# Patient Record
Sex: MALE | Race: WHITE | Employment: FULL TIME | URBAN - METROPOLITAN AREA
[De-identification: names, ages, dates, MRNs, and addresses within clinical notes are randomized per-mention and may not be internally consistent; named-entity substitution may affect disease eponyms.]

---

## 2017-04-29 ENCOUNTER — APPOINTMENT (OUTPATIENT)
Dept: GENERAL RADIOLOGY | Age: 42
End: 2017-04-29
Attending: PHYSICIAN ASSISTANT
Payer: COMMERCIAL

## 2017-04-29 ENCOUNTER — HOSPITAL ENCOUNTER (OUTPATIENT)
Age: 42
Discharge: HOME | End: 2017-04-29
Payer: COMMERCIAL

## 2017-04-29 ENCOUNTER — HOSPITAL ENCOUNTER (EMERGENCY)
Age: 42
Discharge: STILL ADMITTED OR ADMINISTRATIVE | End: 2017-04-29
Payer: COMMERCIAL

## 2017-04-29 VITALS
RESPIRATION RATE: 20 BRPM | HEIGHT: 75 IN | SYSTOLIC BLOOD PRESSURE: 123 MMHG | DIASTOLIC BLOOD PRESSURE: 84 MMHG | WEIGHT: 255 LBS | TEMPERATURE: 97 F | OXYGEN SATURATION: 98 % | BODY MASS INDEX: 31.71 KG/M2 | HEART RATE: 62 BPM

## 2017-04-29 DIAGNOSIS — S50.12XA TRAUMATIC HEMATOMA OF LEFT FOREARM, INITIAL ENCOUNTER: Primary | ICD-10-CM

## 2017-04-29 PROCEDURE — 99203 OFFICE O/P NEW LOW 30 MIN: CPT

## 2017-04-29 PROCEDURE — 73090 X-RAY EXAM OF FOREARM: CPT | Performed by: RADIOLOGY

## 2017-04-29 PROCEDURE — 99213 OFFICE O/P EST LOW 20 MIN: CPT

## 2017-04-29 NOTE — ED INITIAL ASSESSMENT (HPI)
Pt was holding on to a dog leash that pulled him into a doorframe,(At 730 am today) and now left forearm above wrist is very swollen, and painful

## 2017-04-29 NOTE — ED PROVIDER NOTES
Patient Seen in: [facility] In [address] [place] [address],[address]    History   Patient presents with:  Arm Pain    Stated Complaint: LT ARM SPRAIN    DENIA Victoria is a 51-year-old male who presents today for evaluation of left arm contusion.   Earlier this morning, ED Triage Vitals   BP 04/29/17 1313 130/86 mmHg   Pulse 04/29/17 1307 67   Resp 04/29/17 1307 18   Temp 04/29/17 1307 96.5 °F (35.8 °C)   Temp src 04/29/17 1307 Temporal   SpO2 04/29/17 1307 99 %   O2 Device 04/29/17 1307 None (Room air)       Current:BP Plan: X-rays are reviewed with the patient. Patient is placed in Ace wrap. He is instructed on R ICE and NSAID use. He is encouraged to watch for signs of infection, as hematoma may become infected.   The patient is encouraged to return if any concerning

## 2017-10-03 ENCOUNTER — CHARTING TRANS (OUTPATIENT)
Dept: OTHER | Age: 42
End: 2017-10-03

## 2018-09-27 ENCOUNTER — OFFICE VISIT (OUTPATIENT)
Dept: FAMILY MEDICINE CLINIC | Facility: CLINIC | Age: 43
End: 2018-09-27

## 2018-09-27 VITALS
RESPIRATION RATE: 16 BRPM | HEART RATE: 72 BPM | WEIGHT: 257 LBS | SYSTOLIC BLOOD PRESSURE: 138 MMHG | DIASTOLIC BLOOD PRESSURE: 86 MMHG | HEIGHT: 75 IN | TEMPERATURE: 98 F | BODY MASS INDEX: 31.95 KG/M2

## 2018-09-27 DIAGNOSIS — E55.9 VITAMIN D DEFICIENCY: ICD-10-CM

## 2018-09-27 DIAGNOSIS — B35.1 TOENAIL FUNGUS: ICD-10-CM

## 2018-09-27 DIAGNOSIS — Z00.00 LABORATORY EXAMINATION ORDERED AS PART OF A ROUTINE GENERAL MEDICAL EXAMINATION: ICD-10-CM

## 2018-09-27 DIAGNOSIS — Z23 NEED FOR INFLUENZA VACCINATION: ICD-10-CM

## 2018-09-27 DIAGNOSIS — Z00.00 WELLNESS EXAMINATION: Primary | ICD-10-CM

## 2018-09-27 PROCEDURE — 90686 IIV4 VACC NO PRSV 0.5 ML IM: CPT | Performed by: PHYSICIAN ASSISTANT

## 2018-09-27 PROCEDURE — 90471 IMMUNIZATION ADMIN: CPT | Performed by: PHYSICIAN ASSISTANT

## 2018-09-27 PROCEDURE — 99396 PREV VISIT EST AGE 40-64: CPT | Performed by: PHYSICIAN ASSISTANT

## 2018-09-27 RX ORDER — LORATADINE 10 MG/1
10 TABLET ORAL DAILY
COMMUNITY

## 2018-09-27 NOTE — PROGRESS NOTES
REASON FOR VISIT:    Alyson Santana is a 41year old male who presents for an Annual Health Assessment.     Pt needs paperwork for insurance    Pt would like a refill on Penlac, he has used it in the past for toenail fungus and the symptoms have returned 02/28/2014 140 (H)     LDL Cholesterol (mg/dL)   Date Value   09/28/2018 149 (H)      Diabetes Screening  If history of high blood pressure or other  risk factors No results found for: A1C  Glucose (mg/dL)   Date Value   09/28/2018 97     GLUCOSE (mg/dL) REVIEW OF SYSTEMS:   GENERAL: feels well otherwise  SKIN: denies any unusual skin lesions, + toenail fungus  EYES: denies blurred vision or double vision  HEENT: denies nasal congestion, sinus pain or ST  LUNGS: denies shortness of breath with exertion Future    Laboratory examination ordered as part of a routine general medical examination  -     CBC WITH DIFFERENTIAL WITH PLATELET; Future  -     COMP METABOLIC PANEL (14); Future  -     LIPID PANEL;  Future  -     TSH+FREE T4; Future  -     VITAMIN D, 25

## 2018-09-27 NOTE — PATIENT INSTRUCTIONS
Thank you for choosing Codie Munguia PA-C at [place] [address]  To Do: Jenna Duffy  1. Pt to get labs done  2.  Follow-up in 1 year pending those    • Please signup for MY CHART, which is electronic access to your record if you have not done so.  Scheduling at [phone]  Please allow our office 5 business days to contact you regarding any testing results.     Refill policies:   Allow 3 business days for refills; controlled substances may take longer and must be picked up from the office in person

## 2018-09-28 ENCOUNTER — LAB ENCOUNTER (OUTPATIENT)
Dept: LAB | Age: 43
End: 2018-09-28
Attending: FAMILY MEDICINE
Payer: COMMERCIAL

## 2018-09-28 ENCOUNTER — TELEPHONE (OUTPATIENT)
Dept: FAMILY MEDICINE CLINIC | Facility: CLINIC | Age: 43
End: 2018-09-28

## 2018-09-28 DIAGNOSIS — Z00.00 LABORATORY EXAMINATION ORDERED AS PART OF A ROUTINE GENERAL MEDICAL EXAMINATION: ICD-10-CM

## 2018-09-28 DIAGNOSIS — E55.9 VITAMIN D DEFICIENCY: ICD-10-CM

## 2018-09-28 LAB
ALBUMIN SERPL-MCNC: 4.4 G/DL (ref 3.5–4.8)
ALBUMIN/GLOB SERPL: 1.5 {RATIO} (ref 1–2)
ALP LIVER SERPL-CCNC: 92 U/L (ref 45–117)
ALT SERPL-CCNC: 71 U/L (ref 17–63)
ANION GAP SERPL CALC-SCNC: 6 MMOL/L (ref 0–18)
AST SERPL-CCNC: 25 U/L (ref 15–41)
BASOPHILS # BLD AUTO: 0.02 X10(3) UL (ref 0–0.1)
BASOPHILS NFR BLD AUTO: 0.4 %
BILIRUB SERPL-MCNC: 0.9 MG/DL (ref 0.1–2)
BUN BLD-MCNC: 14 MG/DL (ref 8–20)
BUN/CREAT SERPL: 13.9 (ref 10–20)
CALCIUM BLD-MCNC: 9 MG/DL (ref 8.3–10.3)
CHLORIDE SERPL-SCNC: 105 MMOL/L (ref 101–111)
CHOLEST SMN-MCNC: 217 MG/DL (ref ?–200)
CO2 SERPL-SCNC: 30 MMOL/L (ref 22–32)
COMPLEXED PSA SERPL-MCNC: 1.04 NG/ML (ref 0.01–4)
CREAT BLD-MCNC: 1.01 MG/DL (ref 0.7–1.3)
EOSINOPHIL # BLD AUTO: 0.07 X10(3) UL (ref 0–0.3)
EOSINOPHIL NFR BLD AUTO: 1.3 %
ERYTHROCYTE [DISTWIDTH] IN BLOOD BY AUTOMATED COUNT: 12.4 % (ref 11.5–16)
GLOBULIN PLAS-MCNC: 3 G/DL (ref 2.5–4)
GLUCOSE BLD-MCNC: 97 MG/DL (ref 70–99)
HCT VFR BLD AUTO: 48.7 % (ref 37–53)
HDLC SERPL-MCNC: 44 MG/DL (ref 40–59)
HGB BLD-MCNC: 17 G/DL (ref 13–17)
IMMATURE GRANULOCYTE COUNT: 0.01 X10(3) UL (ref 0–1)
IMMATURE GRANULOCYTE RATIO %: 0.2 %
LDLC SERPL CALC-MCNC: 149 MG/DL (ref ?–100)
LYMPHOCYTES # BLD AUTO: 0.79 X10(3) UL (ref 0.9–4)
LYMPHOCYTES NFR BLD AUTO: 14.2 %
M PROTEIN MFR SERPL ELPH: 7.4 G/DL (ref 6.1–8.3)
MCH RBC QN AUTO: 30.4 PG (ref 27–33.2)
MCHC RBC AUTO-ENTMCNC: 34.9 G/DL (ref 31–37)
MCV RBC AUTO: 87 FL (ref 80–99)
MONOCYTES # BLD AUTO: 0.42 X10(3) UL (ref 0.1–1)
MONOCYTES NFR BLD AUTO: 7.5 %
NEUTROPHIL ABS PRELIM: 4.26 X10 (3) UL (ref 1.3–6.7)
NEUTROPHILS # BLD AUTO: 4.26 X10(3) UL (ref 1.3–6.7)
NEUTROPHILS NFR BLD AUTO: 76.4 %
NONHDLC SERPL-MCNC: 173 MG/DL (ref ?–130)
OSMOLALITY SERPL CALC.SUM OF ELEC: 292 MOSM/KG (ref 275–295)
PLATELET # BLD AUTO: 249 10(3)UL (ref 150–450)
POTASSIUM SERPL-SCNC: 4.1 MMOL/L (ref 3.6–5.1)
RBC # BLD AUTO: 5.6 X10(6)UL (ref 4.3–5.7)
RED CELL DISTRIBUTION WIDTH-SD: 39.1 FL (ref 35.1–46.3)
SODIUM SERPL-SCNC: 141 MMOL/L (ref 136–144)
T4 FREE SERPL-MCNC: 1.1 NG/DL (ref 0.9–1.8)
TRIGL SERPL-MCNC: 119 MG/DL (ref 30–149)
TSI SER-ACNC: 1.56 MIU/ML (ref 0.35–5.5)
VIT D+METAB SERPL-MCNC: 18.7 NG/ML (ref 30–100)
VLDLC SERPL CALC-MCNC: 24 MG/DL (ref 0–30)
WBC # BLD AUTO: 5.6 X10(3) UL (ref 4–13)

## 2018-09-28 PROCEDURE — 84439 ASSAY OF FREE THYROXINE: CPT

## 2018-09-28 PROCEDURE — 80053 COMPREHEN METABOLIC PANEL: CPT

## 2018-09-28 PROCEDURE — 84443 ASSAY THYROID STIM HORMONE: CPT

## 2018-09-28 PROCEDURE — 80061 LIPID PANEL: CPT

## 2018-09-28 PROCEDURE — 85025 COMPLETE CBC W/AUTO DIFF WBC: CPT

## 2018-09-28 PROCEDURE — 82306 VITAMIN D 25 HYDROXY: CPT

## 2018-09-28 PROCEDURE — 36415 COLL VENOUS BLD VENIPUNCTURE: CPT

## 2018-09-28 NOTE — TELEPHONE ENCOUNTER
Pt was seen by Nellie Nissen on 9/27/18 for employee physical and brought in form. Labs completed and form completed and signed by Nellie Nissen. Faxed form to Jony @ [phone]. Fax confirmation received.

## 2018-10-02 NOTE — PROGRESS NOTES
Elevated cholesterol, work on low carb diet, increase physical activity  Low Vit D, begin 50,000 IU weekly x 12 weeks, and then OTC 2,000 IU daily  One liver enzyme is a little elevated, repeat CMP in 2 weeks  OV in 6 months, recheck lipids at that time

## 2018-10-11 DIAGNOSIS — E78.00 ELEVATED CHOLESTEROL: Primary | ICD-10-CM

## 2018-10-11 RX ORDER — ERGOCALCIFEROL 1.25 MG/1
50000 CAPSULE ORAL WEEKLY
Qty: 12 CAPSULE | Refills: 0 | Status: SHIPPED | OUTPATIENT
Start: 2018-10-11 | End: 2018-12-28

## 2018-11-02 VITALS
TEMPERATURE: 99.2 F | HEIGHT: 75 IN | WEIGHT: 240 LBS | BODY MASS INDEX: 29.84 KG/M2 | SYSTOLIC BLOOD PRESSURE: 122 MMHG | HEART RATE: 72 BPM | RESPIRATION RATE: 12 BRPM | DIASTOLIC BLOOD PRESSURE: 78 MMHG

## 2019-09-23 ENCOUNTER — OFFICE VISIT (OUTPATIENT)
Dept: FAMILY MEDICINE CLINIC | Facility: CLINIC | Age: 44
End: 2019-09-23

## 2019-09-23 VITALS
SYSTOLIC BLOOD PRESSURE: 134 MMHG | WEIGHT: 255 LBS | BODY MASS INDEX: 31.71 KG/M2 | HEART RATE: 80 BPM | RESPIRATION RATE: 14 BRPM | DIASTOLIC BLOOD PRESSURE: 80 MMHG | TEMPERATURE: 98 F | HEIGHT: 75 IN | OXYGEN SATURATION: 98 %

## 2019-09-23 DIAGNOSIS — E78.5 DYSLIPIDEMIA: ICD-10-CM

## 2019-09-23 DIAGNOSIS — Z00.00 ROUTINE GENERAL MEDICAL EXAMINATION AT A HEALTH CARE FACILITY: ICD-10-CM

## 2019-09-23 DIAGNOSIS — Z00.00 WELL ADULT EXAM: Primary | ICD-10-CM

## 2019-09-23 DIAGNOSIS — Z23 NEED FOR INFLUENZA VACCINATION: ICD-10-CM

## 2019-09-23 DIAGNOSIS — E55.9 HYPOVITAMINOSIS D: ICD-10-CM

## 2019-09-23 PROCEDURE — 90471 IMMUNIZATION ADMIN: CPT | Performed by: FAMILY MEDICINE

## 2019-09-23 PROCEDURE — 90686 IIV4 VACC NO PRSV 0.5 ML IM: CPT | Performed by: FAMILY MEDICINE

## 2019-09-23 PROCEDURE — 99396 PREV VISIT EST AGE 40-64: CPT | Performed by: FAMILY MEDICINE

## 2019-09-23 NOTE — PATIENT INSTRUCTIONS
Prevention Guidelines, Men Ages 36 to 52  Screening tests and vaccines are an important part of managing your health. A screening test is done to find possible disorders or diseases in people who don't have any symptoms.  The goal is to find a disease ear Syphilis Men at increased risk for infection – talk with your healthcare provider At routine exams   Tuberculosis Men at increased risk for infection – talk with your healthcare provider Check with your healthcare provider   Vision All men in this age [de-identified] Diet and exercise Men who are overweight or obese When diagnosed, and then at routine exams   Sexually transmitted infection prevention Men at increased risk for infection – talk with your healthcare provider At routine exams   Use of daily aspirin Men age · Half a cup cut-up raw or cooked vegetable  · Half a cup vegetable juice  Best choices: Fresh or frozen vegetables prepared without added salt or fat.    Fruits  Servings: 4 to 5 a day  A serving is:  · 1 medium fruit  · One-quarter cup dried fruit  · Half Date Last Reviewed: 6/1/2016  © 9629-2207 [employer] [address]. [address] [facility] [place], [address] [place] [place]. All rights reserved. This information is not intended as a substitute for professional medical care.  Always follow your healthcare professional'

## 2019-09-23 NOTE — PROGRESS NOTES
Patient presents with:  Physical: Routine annual physical with fasting labs due  Complete Form: Biometric screening form  Imm/Inj: Flu shot- consent signed      HPI:  Patient presents with:  Physical: Routine annual physical with fasting labs due  Complete HENT: Negative for hearing loss, congestion, sore throat, neck pain and dental problem. Eyes: Negative for pain and visual disturbance. Respiratory: Negative for cough, chest tightness, shortness of breath and wheezing.     Cardiovascular: Negative for loratadine 10 MG Oral Tab Take 10 mg by mouth daily.  Disp:  Rfl:        Allergies  No Known Allergies    Health Maintenance  Immunizations:    Immunization History  Administered            Date(s) Administered    FLULAVAL 6 months & older 0.5 ml Prefilled - FLULAVAL INFLUENZA VACCINE QUAD PRESERVATIVE FREE 0.5 ML    3. Dyslipidemia   I recommended daily aerobic exercise for 30 minutes atleast 5 days per week. Along with a healthy diet that is high in fruits, veggies, lean meats, protein.     - LIPID PANEL; F Blood pressure All men in this age group Yearly checkup if your blood pressure reading is normal  Normal blood pressure is less than 120/80 mm Hg  If your blood pressure is higher than normal, follow the advice of your healthcare provider      Depression A Hepatitis B Men at increased risk for infection – talk with your healthcare provider 3 doses over 6 months; second dose should be given 1 month after the first dose; the third dose should be given at least 2 months after the second dose and at least 4 yasemin © 9717-5958 [employer] [address]. [address] [facility] [place], [address] [place]. All rights reserved. This information is not intended as a substitute for professional medical care. Always follow your healthcare professional's instructions.         Eating · One and a half ounces cheese  Best choices: Skim or 1% milk, low-fat or fat-free yogurt or buttermilk, and low-fat cheeses.         Lean meats, poultry, fish  Servings: 6 or fewer a day  A serving is:  · 1 ounce cooked meats, poultry, or fish  · 1 egg  B

## 2019-09-24 ENCOUNTER — LAB ENCOUNTER (OUTPATIENT)
Dept: LAB | Age: 44
End: 2019-09-24
Attending: FAMILY MEDICINE
Payer: COMMERCIAL

## 2019-09-24 DIAGNOSIS — E55.9 HYPOVITAMINOSIS D: ICD-10-CM

## 2019-09-24 DIAGNOSIS — E78.5 DYSLIPIDEMIA: ICD-10-CM

## 2019-09-24 DIAGNOSIS — Z00.00 ROUTINE GENERAL MEDICAL EXAMINATION AT A HEALTH CARE FACILITY: ICD-10-CM

## 2019-09-24 LAB
ALBUMIN SERPL-MCNC: 4.4 G/DL (ref 3.4–5)
ALBUMIN/GLOB SERPL: 1.5 {RATIO} (ref 1–2)
ALP LIVER SERPL-CCNC: 87 U/L (ref 45–117)
ALT SERPL-CCNC: 69 U/L (ref 16–61)
ANION GAP SERPL CALC-SCNC: 4 MMOL/L (ref 0–18)
AST SERPL-CCNC: 25 U/L (ref 15–37)
BASOPHILS # BLD AUTO: 0.01 X10(3) UL (ref 0–0.2)
BASOPHILS NFR BLD AUTO: 0.2 %
BILIRUB SERPL-MCNC: 1 MG/DL (ref 0.1–2)
BUN BLD-MCNC: 13 MG/DL (ref 7–18)
BUN/CREAT SERPL: 12.5 (ref 10–20)
CALCIUM BLD-MCNC: 9.4 MG/DL (ref 8.5–10.1)
CHLORIDE SERPL-SCNC: 107 MMOL/L (ref 98–112)
CHOLEST SMN-MCNC: 205 MG/DL (ref ?–200)
CO2 SERPL-SCNC: 28 MMOL/L (ref 21–32)
CREAT BLD-MCNC: 1.04 MG/DL (ref 0.7–1.3)
DEPRECATED RDW RBC AUTO: 39.5 FL (ref 35.1–46.3)
EOSINOPHIL # BLD AUTO: 0.07 X10(3) UL (ref 0–0.7)
EOSINOPHIL NFR BLD AUTO: 1.5 %
ERYTHROCYTE [DISTWIDTH] IN BLOOD BY AUTOMATED COUNT: 12.4 % (ref 11–15)
EST. AVERAGE GLUCOSE BLD GHB EST-MCNC: 111 MG/DL (ref 68–126)
GLOBULIN PLAS-MCNC: 2.9 G/DL (ref 2.8–4.4)
GLUCOSE BLD-MCNC: 101 MG/DL (ref 70–99)
HBA1C MFR BLD HPLC: 5.5 % (ref ?–5.7)
HCT VFR BLD AUTO: 49.6 % (ref 39–53)
HDLC SERPL-MCNC: 44 MG/DL (ref 40–59)
HGB BLD-MCNC: 17 G/DL (ref 13–17.5)
IMM GRANULOCYTES # BLD AUTO: 0.01 X10(3) UL (ref 0–1)
IMM GRANULOCYTES NFR BLD: 0.2 %
LDLC SERPL CALC-MCNC: 140 MG/DL (ref ?–100)
LYMPHOCYTES # BLD AUTO: 0.82 X10(3) UL (ref 1–4)
LYMPHOCYTES NFR BLD AUTO: 17.4 %
M PROTEIN MFR SERPL ELPH: 7.3 G/DL (ref 6.4–8.2)
MCH RBC QN AUTO: 30.1 PG (ref 26–34)
MCHC RBC AUTO-ENTMCNC: 34.3 G/DL (ref 31–37)
MCV RBC AUTO: 87.9 FL (ref 80–100)
MONOCYTES # BLD AUTO: 0.35 X10(3) UL (ref 0.1–1)
MONOCYTES NFR BLD AUTO: 7.4 %
NEUTROPHILS # BLD AUTO: 3.46 X10 (3) UL (ref 1.5–7.7)
NEUTROPHILS # BLD AUTO: 3.46 X10(3) UL (ref 1.5–7.7)
NEUTROPHILS NFR BLD AUTO: 73.3 %
NONHDLC SERPL-MCNC: 161 MG/DL (ref ?–130)
OSMOLALITY SERPL CALC.SUM OF ELEC: 288 MOSM/KG (ref 275–295)
PLATELET # BLD AUTO: 214 10(3)UL (ref 150–450)
POTASSIUM SERPL-SCNC: 4.3 MMOL/L (ref 3.5–5.1)
RBC # BLD AUTO: 5.64 X10(6)UL (ref 4.3–5.7)
SODIUM SERPL-SCNC: 139 MMOL/L (ref 136–145)
TRIGL SERPL-MCNC: 103 MG/DL (ref 30–149)
TSI SER-ACNC: 1.1 MIU/ML (ref 0.36–3.74)
VIT D+METAB SERPL-MCNC: 23.1 NG/ML (ref 30–100)
VLDLC SERPL CALC-MCNC: 21 MG/DL (ref 0–30)
WBC # BLD AUTO: 4.7 X10(3) UL (ref 4–11)

## 2019-09-24 PROCEDURE — 80061 LIPID PANEL: CPT

## 2019-09-24 PROCEDURE — 36415 COLL VENOUS BLD VENIPUNCTURE: CPT

## 2019-09-24 PROCEDURE — 84443 ASSAY THYROID STIM HORMONE: CPT

## 2019-09-24 PROCEDURE — 82306 VITAMIN D 25 HYDROXY: CPT

## 2019-09-24 PROCEDURE — 85025 COMPLETE CBC W/AUTO DIFF WBC: CPT

## 2019-09-24 PROCEDURE — 83036 HEMOGLOBIN GLYCOSYLATED A1C: CPT

## 2019-09-24 PROCEDURE — 80053 COMPREHEN METABOLIC PANEL: CPT

## 2019-12-03 ENCOUNTER — OFFICE VISIT (OUTPATIENT)
Dept: FAMILY MEDICINE CLINIC | Facility: CLINIC | Age: 44
End: 2019-12-03

## 2019-12-03 VITALS
HEART RATE: 80 BPM | RESPIRATION RATE: 16 BRPM | BODY MASS INDEX: 32.08 KG/M2 | HEIGHT: 75 IN | DIASTOLIC BLOOD PRESSURE: 74 MMHG | WEIGHT: 258 LBS | TEMPERATURE: 98 F | SYSTOLIC BLOOD PRESSURE: 128 MMHG

## 2019-12-03 DIAGNOSIS — H10.13 ALLERGIC CONJUNCTIVITIS OF BOTH EYES: Primary | ICD-10-CM

## 2019-12-03 PROCEDURE — 99213 OFFICE O/P EST LOW 20 MIN: CPT | Performed by: FAMILY MEDICINE

## 2019-12-03 RX ORDER — OLOPATADINE HYDROCHLORIDE 1 MG/ML
1 SOLUTION/ DROPS OPHTHALMIC 2 TIMES DAILY
Qty: 5 ML | Refills: 0 | Status: SHIPPED | OUTPATIENT
Start: 2019-12-03 | End: 2019-12-10

## 2019-12-03 RX ORDER — POLYMYXIN B SULFATE AND TRIMETHOPRIM 1; 10000 MG/ML; [USP'U]/ML
1 SOLUTION OPHTHALMIC EVERY 4 HOURS
Qty: 10 ML | Refills: 0 | Status: CANCELLED | OUTPATIENT
Start: 2019-12-03 | End: 2019-12-10

## 2019-12-03 NOTE — PROGRESS NOTES
Patient presents with:  Eye Problem: Red crusty eyes for the last coupl of days. He states that he also has a stuffy feeling      HPI:  Everardo Galindo is a 45year old malewho presents for sx's of irritation in the bilateral eye.  Pt has had sx's for 3 days Smokeless tobacco: Never Used    Alcohol use: Yes      Frequency: 2-4 times a month      Drinks per session: 3 or 4      Comment: 1-2 x per week    Drug use: No      Current Outpatient Medications   Medication Sig Dispense Refill   • Olopatadine HCl 0.1 the defined types were placed in this encounter.       Meds & Refills for this Visit:  Requested Prescriptions     Signed Prescriptions Disp Refills   • Olopatadine HCl 0.1 % Ophthalmic Solution 5 mL 0     Sig: Place 1 drop into both eyes 2 (two) times kwan gone.  Follow-up care  Follow up with your healthcare provider, or as advised.   When to seek medical advice  Call your healthcare provider right away if any of these occur:  · Increased eyelid swelling  · New or worsening drainage from the eye  · Increasin

## 2019-12-03 NOTE — PATIENT INSTRUCTIONS
Conjunctivitis, Allergic    Conjunctivitis is an irritation of a thin membrane in the eye. This membrane is called the conjunctiva. It covers the white of the eye and the inside of the eyelid.  The condition is often called pink eye or red eye because the [address] [facility] [place], [address] [place] [address]. All rights reserved. This information is not intended as a substitute for professional medical care. Always follow your healthcare professional's instructions.

## 2020-06-05 ENCOUNTER — VIRTUAL PHONE E/M (OUTPATIENT)
Dept: FAMILY MEDICINE CLINIC | Facility: CLINIC | Age: 45
End: 2020-06-05

## 2020-06-05 DIAGNOSIS — H10.021 OTHER MUCOPURULENT CONJUNCTIVITIS OF RIGHT EYE: Primary | ICD-10-CM

## 2020-06-05 PROCEDURE — 99213 OFFICE O/P EST LOW 20 MIN: CPT | Performed by: NURSE PRACTITIONER

## 2020-06-05 RX ORDER — POLYMYXIN B SULFATE AND TRIMETHOPRIM 1; 10000 MG/ML; [USP'U]/ML
1 SOLUTION OPHTHALMIC EVERY 6 HOURS
Qty: 1 BOTTLE | Refills: 0 | Status: SHIPPED | OUTPATIENT
Start: 2020-06-05 | End: 2020-06-12

## 2020-06-05 NOTE — PROGRESS NOTES
Virtual Telephone Check-In    Alyson Moshanae verbally consents to a Virtual/Telephone Check-In visit on 06/05/20. Patient has been referred to the [facility] website at [email or web address] to review the yearly Consent to Treat document.     Patient Tapan Tran

## 2021-05-26 ENCOUNTER — APPOINTMENT (OUTPATIENT)
Dept: GENERAL RADIOLOGY | Facility: HOSPITAL | Age: 46
End: 2021-05-26
Attending: EMERGENCY MEDICINE
Payer: COMMERCIAL

## 2021-05-26 ENCOUNTER — HOSPITAL ENCOUNTER (EMERGENCY)
Facility: HOSPITAL | Age: 46
Discharge: HOME | End: 2021-05-26
Attending: EMERGENCY MEDICINE
Payer: COMMERCIAL

## 2021-05-26 VITALS
OXYGEN SATURATION: 100 % | HEART RATE: 74 BPM | WEIGHT: 255 LBS | DIASTOLIC BLOOD PRESSURE: 88 MMHG | TEMPERATURE: 97 F | SYSTOLIC BLOOD PRESSURE: 130 MMHG | RESPIRATION RATE: 19 BRPM | HEIGHT: 75 IN | BODY MASS INDEX: 31.71 KG/M2

## 2021-05-26 DIAGNOSIS — S86.011A ACHILLES RUPTURE, RIGHT, INITIAL ENCOUNTER: Primary | ICD-10-CM

## 2021-05-26 PROCEDURE — 73610 X-RAY EXAM OF ANKLE: CPT | Performed by: EMERGENCY MEDICINE

## 2021-05-26 PROCEDURE — 99284 EMERGENCY DEPT VISIT MOD MDM: CPT

## 2021-05-26 PROCEDURE — 99283 EMERGENCY DEPT VISIT LOW MDM: CPT

## 2021-05-26 RX ORDER — HYDROCODONE BITARTRATE AND ACETAMINOPHEN 5; 325 MG/1; MG/1
1 TABLET ORAL ONCE
Status: COMPLETED | OUTPATIENT
Start: 2021-05-26 | End: 2021-05-26

## 2021-05-26 RX ORDER — HYDROCODONE BITARTRATE AND ACETAMINOPHEN 5; 325 MG/1; MG/1
1-2 TABLET ORAL EVERY 6 HOURS PRN
Qty: 10 TABLET | Refills: 0 | Status: SHIPPED | OUTPATIENT
Start: 2021-05-26 | End: 2021-06-02

## 2021-05-27 ENCOUNTER — TELEPHONE (OUTPATIENT)
Dept: ORTHOPEDICS CLINIC | Facility: CLINIC | Age: 46
End: 2021-05-27

## 2021-05-27 ENCOUNTER — TELEPHONE (OUTPATIENT)
Dept: FAMILY MEDICINE CLINIC | Facility: CLINIC | Age: 46
End: 2021-05-27

## 2021-05-27 ENCOUNTER — TELEMEDICINE (OUTPATIENT)
Dept: FAMILY MEDICINE CLINIC | Facility: CLINIC | Age: 46
End: 2021-05-27

## 2021-05-27 DIAGNOSIS — S86.011A RUPTURE OF RIGHT ACHILLES TENDON, INITIAL ENCOUNTER: Primary | ICD-10-CM

## 2021-05-27 DIAGNOSIS — S86.011D RUPTURE OF RIGHT ACHILLES TENDON, SUBSEQUENT ENCOUNTER: Primary | ICD-10-CM

## 2021-05-27 PROCEDURE — 99203 OFFICE O/P NEW LOW 30 MIN: CPT | Performed by: ORTHOPAEDIC SURGERY

## 2021-05-27 PROCEDURE — 99213 OFFICE O/P EST LOW 20 MIN: CPT | Performed by: FAMILY MEDICINE

## 2021-05-27 RX ORDER — ASPIRIN 81 MG/1
81 TABLET ORAL DAILY
COMMUNITY
End: 2021-08-12

## 2021-05-27 NOTE — TELEPHONE ENCOUNTER
Surgeon: Dr. Yue Farah  Date of Surgery:6.3.2021  Facility: [facility], scheduling sheet to referral team? N/A  Is this work comp related? No  Clearance needed: No       If yes, requested? N/A  Post-op Appt: 6.11.21    Faxed request for Tere Givens to Am

## 2021-05-27 NOTE — TELEPHONE ENCOUNTER
Future Appointments   Date Time Provider Ganesh Merrill   6/2/2021 10:20 AM Tylor Burrell MD [facility]   6/11/2021 11:20 AM Tylor Burrell MD [facility]

## 2021-05-27 NOTE — TELEPHONE ENCOUNTER
Patients appt scheduled  Future Appointments   Date Time Provider Ganesh Desirae   6/2/2021 10:20 AM Marci High MD [facility]   6/11/2021 11:20 AM Marci High MD [facility]

## 2021-05-27 NOTE — TELEPHONE ENCOUNTER
OR BOOKING SHEET ANKLE    Name: Riaz Fernandez  MRN: [id number]   : 1975     Diagnosis:  [x] Rupture of right Achilles tendon, initial encounter [S86.011A]    Disposition:    [x] Ambulatory  [] Overnight for LADAN  [] Overnight for observation and pa

## 2021-05-27 NOTE — TELEPHONE ENCOUNTER
Future Appointments   Date Time Provider Ganesh Merrill   5/27/2021 11:15 AM Cliff Evangelista, DO [facility] [address]     Patient has only seen Dr. Jen Pickett and will call his insurance to change the PCP.

## 2021-05-27 NOTE — TELEPHONE ENCOUNTER
New patient with achilles tendon rupture. Xray in [address] [place] [address] Rd. When should patient be scheduled?     NOTE: Message also sent to Dr Celaya July

## 2021-05-27 NOTE — TELEPHONE ENCOUNTER
Kelin Cruz MD     Lets double book at 10:20 on Tuesday 5/1. Please call him and let him know. I also will document a telephone encounter and order an MRI.

## 2021-05-27 NOTE — TELEPHONE ENCOUNTER
New patient with achilles tendon rupture. Xray is in [place]. When can patient be scheduled?     NOTE: Message also sent to Dr Maritza Ramsey

## 2021-05-27 NOTE — TELEPHONE ENCOUNTER
[facility] - ORTHOPEDICS  [place] [address] [phone]     Telephone encounter, discussion regarding ankle injury    Name: Kyle Matias   MRN: [id number]  Date: 5/27/2021     CC: Right ankle Smoker      Smokeless tobacco: Never Used    Alcohol use: Yes      Comment: 1-2 x per week        PE:   Deferred as patient not evaluated in person    Radiographic Examination/Diagnostics:  Ankle x-rays personally viewed, independently interpreted and radio

## 2021-05-27 NOTE — TELEPHONE ENCOUNTER
Pt was seen in the ER yesterday and it was noted that he needs to see ortho for injury. Patient would like to k now  If he can just have the referral without an appt so that he can schedule an appt with ortho.

## 2021-05-27 NOTE — TELEPHONE ENCOUNTER
Please disregard, pt has not been seen since 2019. See TE, can  I have pt schedule f/u but can the referral be placed for achilles rupture prior to appointment?

## 2021-05-27 NOTE — ED PROVIDER NOTES
Patient Seen in: [facility] Emergency Department      History   Patient presents with:  Leg Pain    Stated Complaint: pt reports right leg pain after playing volleyball one hour ago    HPI/Subjective:   HPI    71-year-old male presents emergency vineet mild tenderness of the right calf with defect/bogginess of the right Achilles tendon, positive Lee test on the right, no tenderness or swelling to the right knee ankle or foot, dorsal pedal pulses present and equal bilaterally.   SKIN: Warm, dry, intac

## 2021-05-27 NOTE — PROGRESS NOTES
Subjective    This visit is conducted using Telemedicine with live, interactive video and audio. Chief Complaint:  Patient presents with:   Follow - Up        The patient confirmed knowledge of the limitations of the use of telemedicine were verbally c ORTHOPEDIC - INTERNAL    Diagnostic rationale, follow up instructions, and strict precautions/indications for emergent direct evaluation were discussed with the patient.  The patient agrees with the plan, and understands to follow up with their primary care

## 2021-05-27 NOTE — TELEPHONE ENCOUNTER
Lars Corona  Patient Name: Karissa Naranjo    MRN: [id number]     RIGHT ACHILLES TENDON REPAIR/LENGTHENING       Anesthesia Type:  Choice [130]     Laterality:  Right [2]     Date: 6/3/2021   Time (includes any set-up time): 0687

## 2021-06-01 ENCOUNTER — LAB ENCOUNTER (OUTPATIENT)
Dept: LAB | Age: 46
End: 2021-06-01
Attending: ORTHOPAEDIC SURGERY
Payer: COMMERCIAL

## 2021-06-01 DIAGNOSIS — S86.011A RUPTURE OF RIGHT ACHILLES TENDON: ICD-10-CM

## 2021-06-02 ENCOUNTER — HOSPITAL ENCOUNTER (OUTPATIENT)
Dept: MRI IMAGING | Facility: HOSPITAL | Age: 46
Discharge: HOME | End: 2021-06-02
Attending: ORTHOPAEDIC SURGERY
Payer: COMMERCIAL

## 2021-06-02 ENCOUNTER — OFFICE VISIT (OUTPATIENT)
Dept: ORTHOPEDICS CLINIC | Facility: CLINIC | Age: 46
End: 2021-06-02

## 2021-06-02 ENCOUNTER — TELEPHONE (OUTPATIENT)
Dept: PHYSICAL THERAPY | Facility: HOSPITAL | Age: 46
End: 2021-06-02

## 2021-06-02 DIAGNOSIS — S86.011A RUPTURE OF RIGHT ACHILLES TENDON, INITIAL ENCOUNTER: ICD-10-CM

## 2021-06-02 DIAGNOSIS — S86.011A RUPTURE OF RIGHT ACHILLES TENDON, INITIAL ENCOUNTER: Primary | ICD-10-CM

## 2021-06-02 PROCEDURE — 73721 MRI JNT OF LWR EXTRE W/O DYE: CPT | Performed by: ORTHOPAEDIC SURGERY

## 2021-06-02 PROCEDURE — 99214 OFFICE O/P EST MOD 30 MIN: CPT | Performed by: ORTHOPAEDIC SURGERY

## 2021-06-02 NOTE — H&P
[facility] - ORTHOPEDICS  [facility] [address] [id number] [phone]      History and physical examination.     Name: Nessa Ross   MRN: [id number]  Date: 6/1/2021     CC: Right ankle Pain after volleybal comprehensive 14 point review of systems was performed and was negative aside from the aforementioned per history of present illness.     SOCIAL HX:  Social History    Tobacco Use      Smoking status: Never Smoker      Smokeless tobacco: Never Used    Alco LIGAMENT: Intact TARSAL TUNNEL: Normal without mass or space-occupying lesion. FLEXORS TENDONS: Mild increased fluid within the tibialis posterior tendon sheath with otherwise normal appearance of the tibialis posterior tendon.  Remainder of the flexor tend Finalized by (CST): Evans Hale MD on 6/02/2021 at 3:22 PM             IMPRESSION: Barbara Bolden is a 42year old male with MRI confirmed right Achilles tendon rupture.   Given his high level of activity and goals to participate in a triathlon as well as and answers many FAQ's will be provided. He was encouraged to call the office with any further questions or concerns.       FOLLOW-UP:  Postsurgical evaluation. Postop day 6.          Alondra Cardona MD  Knee, Shoulder, & Elbow Surgery / Sports Medicine S

## 2021-06-02 NOTE — H&P (VIEW-ONLY)
[facility] - ORTHOPEDICS  [facility] [address] [id number]  [phone]      History and physical examination.     Name: Barbara Melvin   MRN: [id number]  Date: 6/1/2021     CC: Right ankle Pain after volleybal comprehensive 14 point review of systems was performed and was negative aside from the aforementioned per history of present illness.     SOCIAL HX:  Social History    Tobacco Use      Smoking status: Never Smoker      Smokeless tobacco: Never Used    Alco LIGAMENT: Intact TARSAL TUNNEL: Normal without mass or space-occupying lesion. FLEXORS TENDONS: Mild increased fluid within the tibialis posterior tendon sheath with otherwise normal appearance of the tibialis posterior tendon.  Remainder of the flexor tend Finalized by (CST): Semaj Hensley MD on 6/02/2021 at 3:22 PM             IMPRESSION: Sally London is a 42year old male with MRI confirmed right Achilles tendon rupture.   Given his high level of activity and goals to participate in a triathlon as well as and answers many FAQ's will be provided. He was encouraged to call the office with any further questions or concerns.       FOLLOW-UP:  Postsurgical evaluation. Postop day 6.          Alondra Cardona MD  Knee, Shoulder, & Elbow Surgery / Sports Medicine S

## 2021-06-03 ENCOUNTER — ANESTHESIA EVENT (OUTPATIENT)
Dept: SURGERY | Facility: HOSPITAL | Age: 46
End: 2021-06-03
Payer: COMMERCIAL

## 2021-06-03 ENCOUNTER — HOSPITAL ENCOUNTER (OUTPATIENT)
Facility: HOSPITAL | Age: 46
Setting detail: HOSPITAL OUTPATIENT SURGERY
Discharge: HOME | End: 2021-06-03
Attending: ORTHOPAEDIC SURGERY | Admitting: ORTHOPAEDIC SURGERY
Payer: COMMERCIAL

## 2021-06-03 ENCOUNTER — ANESTHESIA (OUTPATIENT)
Dept: SURGERY | Facility: HOSPITAL | Age: 46
End: 2021-06-03
Payer: COMMERCIAL

## 2021-06-03 ENCOUNTER — TELEPHONE (OUTPATIENT)
Dept: PHYSICAL THERAPY | Facility: HOSPITAL | Age: 46
End: 2021-06-03

## 2021-06-03 VITALS
RESPIRATION RATE: 16 BRPM | TEMPERATURE: 97 F | SYSTOLIC BLOOD PRESSURE: 147 MMHG | HEART RATE: 65 BPM | DIASTOLIC BLOOD PRESSURE: 94 MMHG | HEIGHT: 75 IN | OXYGEN SATURATION: 96 % | WEIGHT: 255.75 LBS | BODY MASS INDEX: 31.8 KG/M2

## 2021-06-03 DIAGNOSIS — S86.011A RUPTURE OF RIGHT ACHILLES TENDON, INITIAL ENCOUNTER: ICD-10-CM

## 2021-06-03 DIAGNOSIS — S86.011A RUPTURE OF RIGHT ACHILLES TENDON: Primary | ICD-10-CM

## 2021-06-03 PROCEDURE — 3E0T3BZ INTRODUCTION OF ANESTHETIC AGENT INTO PERIPHERAL NERVES AND PLEXI, PERCUTANEOUS APPROACH: ICD-10-PCS | Performed by: ANESTHESIOLOGY

## 2021-06-03 PROCEDURE — 76942 ECHO GUIDE FOR BIOPSY: CPT | Performed by: ANESTHESIOLOGY

## 2021-06-03 PROCEDURE — 0LQN0ZZ REPAIR RIGHT LOWER LEG TENDON, OPEN APPROACH: ICD-10-PCS | Performed by: ORTHOPAEDIC SURGERY

## 2021-06-03 DEVICE — PARS SUTURE IMPLANT KIT W/ SUTURETAPE
Type: IMPLANTABLE DEVICE | Site: ACHILLES TENDON | Status: FUNCTIONAL
Brand: ARTHREX®

## 2021-06-03 RX ORDER — POLYMYXIN B 500000 [USP'U]/1
INJECTION, POWDER, LYOPHILIZED, FOR SOLUTION INTRAMUSCULAR; INTRATHECAL; INTRAVENOUS; OPHTHALMIC AS NEEDED
Status: DISCONTINUED | OUTPATIENT
Start: 2021-06-03 | End: 2021-06-03 | Stop reason: HOSPADM

## 2021-06-03 RX ORDER — HYDROCODONE BITARTRATE AND ACETAMINOPHEN 5; 325 MG/1; MG/1
1 TABLET ORAL AS NEEDED
Status: DISCONTINUED | OUTPATIENT
Start: 2021-06-03 | End: 2021-06-03

## 2021-06-03 RX ORDER — NEOSTIGMINE METHYLSULFATE 1 MG/ML
INJECTION INTRAVENOUS AS NEEDED
Status: DISCONTINUED | OUTPATIENT
Start: 2021-06-03 | End: 2021-06-03 | Stop reason: SURG

## 2021-06-03 RX ORDER — TRAMADOL HYDROCHLORIDE 50 MG/1
TABLET ORAL
Qty: 20 TABLET | Refills: 1 | Status: SHIPPED | OUTPATIENT
Start: 2021-06-03 | End: 2021-08-12

## 2021-06-03 RX ORDER — KETAMINE HYDROCHLORIDE 50 MG/ML
INJECTION, SOLUTION, CONCENTRATE INTRAMUSCULAR; INTRAVENOUS AS NEEDED
Status: DISCONTINUED | OUTPATIENT
Start: 2021-06-03 | End: 2021-06-03 | Stop reason: SURG

## 2021-06-03 RX ORDER — DEXAMETHASONE SODIUM PHOSPHATE 4 MG/ML
VIAL (ML) INJECTION AS NEEDED
Status: DISCONTINUED | OUTPATIENT
Start: 2021-06-03 | End: 2021-06-03 | Stop reason: SURG

## 2021-06-03 RX ORDER — BUPRENORPHINE HYDROCHLORIDE 0.32 MG/ML
INJECTION INTRAMUSCULAR; INTRAVENOUS AS NEEDED
Status: DISCONTINUED | OUTPATIENT
Start: 2021-06-03 | End: 2021-06-03 | Stop reason: SURG

## 2021-06-03 RX ORDER — ROCURONIUM BROMIDE 10 MG/ML
INJECTION, SOLUTION INTRAVENOUS AS NEEDED
Status: DISCONTINUED | OUTPATIENT
Start: 2021-06-03 | End: 2021-06-03 | Stop reason: SURG

## 2021-06-03 RX ORDER — ONDANSETRON 2 MG/ML
4 INJECTION INTRAMUSCULAR; INTRAVENOUS AS NEEDED
Status: DISCONTINUED | OUTPATIENT
Start: 2021-06-03 | End: 2021-06-03

## 2021-06-03 RX ORDER — SODIUM CHLORIDE, SODIUM LACTATE, POTASSIUM CHLORIDE, CALCIUM CHLORIDE 600; 310; 30; 20 MG/100ML; MG/100ML; MG/100ML; MG/100ML
INJECTION, SOLUTION INTRAVENOUS CONTINUOUS
Status: DISCONTINUED | OUTPATIENT
Start: 2021-06-03 | End: 2021-06-03

## 2021-06-03 RX ORDER — DEXAMETHASONE SODIUM PHOSPHATE 10 MG/ML
INJECTION, SOLUTION INTRAMUSCULAR; INTRAVENOUS AS NEEDED
Status: DISCONTINUED | OUTPATIENT
Start: 2021-06-03 | End: 2021-06-03 | Stop reason: SURG

## 2021-06-03 RX ORDER — ACETAMINOPHEN 500 MG
1000 TABLET ORAL ONCE
Status: DISCONTINUED | OUTPATIENT
Start: 2021-06-03 | End: 2021-06-03 | Stop reason: HOSPADM

## 2021-06-03 RX ORDER — ONDANSETRON 2 MG/ML
INJECTION INTRAMUSCULAR; INTRAVENOUS AS NEEDED
Status: DISCONTINUED | OUTPATIENT
Start: 2021-06-03 | End: 2021-06-03 | Stop reason: SURG

## 2021-06-03 RX ORDER — ASPIRIN 81 MG/1
81 TABLET ORAL DAILY
Qty: 30 TABLET | Refills: 0 | Status: SHIPPED | OUTPATIENT
Start: 2021-06-03 | End: 2022-01-14

## 2021-06-03 RX ORDER — CEFAZOLIN SODIUM/WATER 2 G/20 ML
2 SYRINGE (ML) INTRAVENOUS ONCE
Status: COMPLETED | OUTPATIENT
Start: 2021-06-03 | End: 2021-06-03

## 2021-06-03 RX ORDER — MIDAZOLAM HYDROCHLORIDE 1 MG/ML
INJECTION INTRAMUSCULAR; INTRAVENOUS AS NEEDED
Status: DISCONTINUED | OUTPATIENT
Start: 2021-06-03 | End: 2021-06-03 | Stop reason: SURG

## 2021-06-03 RX ORDER — ONDANSETRON 4 MG/1
4 TABLET, ORALLY DISINTEGRATING ORAL EVERY 8 HOURS PRN
Qty: 8 TABLET | Refills: 0 | Status: SHIPPED | OUTPATIENT
Start: 2021-06-03 | End: 2021-08-12

## 2021-06-03 RX ORDER — MEPERIDINE HYDROCHLORIDE 25 MG/ML
12.5 INJECTION INTRAMUSCULAR; INTRAVENOUS; SUBCUTANEOUS AS NEEDED
Status: DISCONTINUED | OUTPATIENT
Start: 2021-06-03 | End: 2021-06-03

## 2021-06-03 RX ORDER — METOCLOPRAMIDE HYDROCHLORIDE 5 MG/ML
INJECTION INTRAMUSCULAR; INTRAVENOUS AS NEEDED
Status: DISCONTINUED | OUTPATIENT
Start: 2021-06-03 | End: 2021-06-03 | Stop reason: SURG

## 2021-06-03 RX ORDER — HYDROMORPHONE HYDROCHLORIDE 1 MG/ML
0.4 INJECTION, SOLUTION INTRAMUSCULAR; INTRAVENOUS; SUBCUTANEOUS EVERY 5 MIN PRN
Status: DISCONTINUED | OUTPATIENT
Start: 2021-06-03 | End: 2021-06-03

## 2021-06-03 RX ORDER — GLYCOPYRROLATE 0.2 MG/ML
INJECTION, SOLUTION INTRAMUSCULAR; INTRAVENOUS AS NEEDED
Status: DISCONTINUED | OUTPATIENT
Start: 2021-06-03 | End: 2021-06-03 | Stop reason: SURG

## 2021-06-03 RX ORDER — ACETAMINOPHEN 500 MG
1000 TABLET ORAL ONCE
COMMUNITY
End: 2021-08-12

## 2021-06-03 RX ORDER — HYDROCODONE BITARTRATE AND ACETAMINOPHEN 5; 325 MG/1; MG/1
2 TABLET ORAL AS NEEDED
Status: DISCONTINUED | OUTPATIENT
Start: 2021-06-03 | End: 2021-06-03

## 2021-06-03 RX ORDER — LIDOCAINE HYDROCHLORIDE 10 MG/ML
INJECTION, SOLUTION EPIDURAL; INFILTRATION; INTRACAUDAL; PERINEURAL AS NEEDED
Status: DISCONTINUED | OUTPATIENT
Start: 2021-06-03 | End: 2021-06-03 | Stop reason: SURG

## 2021-06-03 RX ORDER — BUPIVACAINE HYDROCHLORIDE 5 MG/ML
INJECTION, SOLUTION EPIDURAL; INTRACAUDAL AS NEEDED
Status: DISCONTINUED | OUTPATIENT
Start: 2021-06-03 | End: 2021-06-03 | Stop reason: SURG

## 2021-06-03 RX ORDER — CEFAZOLIN SODIUM/WATER 2 G/20 ML
SYRINGE (ML) INTRAVENOUS
Status: DISCONTINUED
Start: 2021-06-03 | End: 2021-06-03

## 2021-06-03 RX ORDER — NALOXONE HYDROCHLORIDE 0.4 MG/ML
80 INJECTION, SOLUTION INTRAMUSCULAR; INTRAVENOUS; SUBCUTANEOUS AS NEEDED
Status: DISCONTINUED | OUTPATIENT
Start: 2021-06-03 | End: 2021-06-03

## 2021-06-03 RX ADMIN — ROCURONIUM BROMIDE 50 MG: 10 INJECTION, SOLUTION INTRAVENOUS at 14:51:00

## 2021-06-03 RX ADMIN — KETAMINE HYDROCHLORIDE 10 MG: 50 INJECTION, SOLUTION, CONCENTRATE INTRAMUSCULAR; INTRAVENOUS at 14:40:00

## 2021-06-03 RX ADMIN — NEOSTIGMINE METHYLSULFATE 3 MG: 1 INJECTION INTRAVENOUS at 16:43:00

## 2021-06-03 RX ADMIN — LIDOCAINE HYDROCHLORIDE 50 MG: 10 INJECTION, SOLUTION EPIDURAL; INFILTRATION; INTRACAUDAL; PERINEURAL at 14:51:00

## 2021-06-03 RX ADMIN — CEFAZOLIN SODIUM/WATER 2 G: 2 G/20 ML SYRINGE (ML) INTRAVENOUS at 15:01:00

## 2021-06-03 RX ADMIN — BUPIVACAINE HYDROCHLORIDE 20 ML: 5 INJECTION, SOLUTION EPIDURAL; INTRACAUDAL at 14:50:00

## 2021-06-03 RX ADMIN — SODIUM CHLORIDE, SODIUM LACTATE, POTASSIUM CHLORIDE, CALCIUM CHLORIDE: 600; 310; 30; 20 INJECTION, SOLUTION INTRAVENOUS at 15:31:00

## 2021-06-03 RX ADMIN — SODIUM CHLORIDE, SODIUM LACTATE, POTASSIUM CHLORIDE, CALCIUM CHLORIDE: 600; 310; 30; 20 INJECTION, SOLUTION INTRAVENOUS at 15:45:00

## 2021-06-03 RX ADMIN — SODIUM CHLORIDE, SODIUM LACTATE, POTASSIUM CHLORIDE, CALCIUM CHLORIDE: 600; 310; 30; 20 INJECTION, SOLUTION INTRAVENOUS at 15:30:00

## 2021-06-03 RX ADMIN — ONDANSETRON 4 MG: 2 INJECTION INTRAMUSCULAR; INTRAVENOUS at 16:26:00

## 2021-06-03 RX ADMIN — BUPRENORPHINE HYDROCHLORIDE 150 MCG: 0.32 INJECTION INTRAMUSCULAR; INTRAVENOUS at 14:45:00

## 2021-06-03 RX ADMIN — METOCLOPRAMIDE HYDROCHLORIDE 10 MG: 5 INJECTION INTRAMUSCULAR; INTRAVENOUS at 14:54:00

## 2021-06-03 RX ADMIN — DEXAMETHASONE SODIUM PHOSPHATE 2 MG: 10 INJECTION, SOLUTION INTRAMUSCULAR; INTRAVENOUS at 14:45:00

## 2021-06-03 RX ADMIN — GLYCOPYRROLATE 0.4 MG: 0.2 INJECTION, SOLUTION INTRAMUSCULAR; INTRAVENOUS at 16:43:00

## 2021-06-03 RX ADMIN — SODIUM CHLORIDE, SODIUM LACTATE, POTASSIUM CHLORIDE, CALCIUM CHLORIDE: 600; 310; 30; 20 INJECTION, SOLUTION INTRAVENOUS at 14:30:00

## 2021-06-03 RX ADMIN — MIDAZOLAM HYDROCHLORIDE 2 MG: 1 INJECTION INTRAMUSCULAR; INTRAVENOUS at 14:40:00

## 2021-06-03 RX ADMIN — DEXAMETHASONE SODIUM PHOSPHATE 8 MG: 4 MG/ML VIAL (ML) INJECTION at 14:54:00

## 2021-06-03 NOTE — ANESTHESIA POSTPROCEDURE EVALUATION
[place] Patient Status:  Hospital Outpatient Surgery   Age/Gender 42year old adult MRN [id number]   [facility] SURGERY Attending Jem Davis MD   [facility] Day # 0 PCP Anu Coello DO       Anesthesia Post-op

## 2021-06-03 NOTE — OPERATIVE REPORT
[facility] OPERATIVE RECORD  ATTENDING SURGEON: Lily Aragon MD  ASSISTANT(S): Nestor Keenan Hammond, [place] [facility]  PRELIMINARY DIAGNOSIS:  1. Right Achilles Tendon Rupture     POSTOPERATIVE DIAGNOSIS:  1.   Right Achilles Tendon questions, which were all answered to the best of my ability and to his satisfaction. [facility] wished to proceed. On the day of surgery, [facility] in the preoperative area. I confirmed his identity by name and birthday.  We reviewed and confirmed then passed and modified [place] fashion proximally using a straight needle and traversed the repair site and the knot was tied along the medial aspect away from the sural nerve. The Achilles was irrigated.   Notably the plantaris tendon was not identified

## 2021-06-03 NOTE — ANESTHESIA PROCEDURE NOTES
Regional Block  Performed by: Tere Thomson MD  Authorized by: Tere Thomson MD       General Information and Staff    Start Time:  6/3/2021 2:42 PM  End Time:  6/3/2021 2:45 PM  Anesthesiologist:  Tere Thomson MD  Performed by:   Anesthesiologist  P

## 2021-06-03 NOTE — ANESTHESIA PREPROCEDURE EVALUATION
PRE-OP EVALUATION    Patient Name: Betty Mae    Admit Diagnosis: Rupture of right Achilles tendon, initial encounter [Q02.262S]    Pre-op Diagnosis: Rupture of right Achilles tendon, initial encounter [S86.011A]    RIGHT ACHILLES TENDON REPAIR/LENGTHE findings            ASA: 1   Plan: regional, general and MAC  NPO status verified and patient meets guidelines. Post-procedure pain management plan discussed with surgeon and patient. Comment: Plan IV sedation with GA backup.   Risks and benefits of M

## 2021-06-03 NOTE — ANESTHESIA PROCEDURE NOTES
Airway  Urgency: elective    Airway not difficult    General Information and Staff    Patient location during procedure: OR  Anesthesiologist: Dedrick Treviño MD  Performed: anesthesiologist     Indications and Patient Condition  Indications for airway man

## 2021-06-03 NOTE — BRIEF OP NOTE
Pre-Operative Diagnosis: Rupture of right Achilles tendon, initial encounter [S86.011A]     Post-Operative Diagnosis: * No post-op diagnosis entered *      Procedure Performed:   RIGHT ACHILLES TENDON REPAIR    Surgeon(s) and Role:     * Zainab Salgaod

## 2021-06-03 NOTE — ANESTHESIA PROCEDURE NOTES
Regional Block  Performed by: Brooke Childress MD  Authorized by: Brooke Childress MD       General Information and Staff    Start Time:  6/3/2021 2:45 PM  End Time:  6/3/2021 2:50 PM  Anesthesiologist:  Brooke Chilrdess MD  Performed by:   Anesthesiologist  P

## 2021-06-07 ENCOUNTER — TELEPHONE (OUTPATIENT)
Dept: PHYSICAL THERAPY | Facility: HOSPITAL | Age: 46
End: 2021-06-07

## 2021-06-08 ENCOUNTER — OFFICE VISIT (OUTPATIENT)
Dept: ORTHOPEDICS CLINIC | Facility: CLINIC | Age: 46
End: 2021-06-08
Payer: COMMERCIAL

## 2021-06-08 DIAGNOSIS — S86.011D RUPTURE OF RIGHT ACHILLES TENDON, SUBSEQUENT ENCOUNTER: Primary | ICD-10-CM

## 2021-06-08 PROCEDURE — 99024 POSTOP FOLLOW-UP VISIT: CPT | Performed by: ORTHOPAEDIC SURGERY

## 2021-06-08 NOTE — PROGRESS NOTES
[facility]  [address] [facility] [address]     Name: Franki Bender   MRN: [id number]  Date: 6/8/2021     REASON FOR VISIT: First Post-Surgical Visit  Date of Surgery: 6/3/2020–juan tendon. No obvious peripheral edema noted. Distal neurovascular exam demonstrates normal perfusion, intact sensation to light touch.          IMPRESSION: Jenna Duffy is a 42year old adult POD #5 s/p right Achilles tendon repair, doing well without

## 2021-06-09 ENCOUNTER — OFFICE VISIT (OUTPATIENT)
Dept: PHYSICAL THERAPY | Age: 46
End: 2021-06-09
Attending: ORTHOPAEDIC SURGERY
Payer: COMMERCIAL

## 2021-06-09 DIAGNOSIS — S86.011A RUPTURE OF RIGHT ACHILLES TENDON, INITIAL ENCOUNTER: ICD-10-CM

## 2021-06-09 PROCEDURE — 97110 THERAPEUTIC EXERCISES: CPT

## 2021-06-09 PROCEDURE — 97162 PT EVAL MOD COMPLEX 30 MIN: CPT

## 2021-06-09 PROCEDURE — 97016 VASOPNEUMATIC DEVICE THERAPY: CPT

## 2021-06-09 NOTE — PROGRESS NOTES
LOWER EXTREMITY EVALUATION:   Referring Physician: Dr. Letty Dailey  Diagnosis: s/p right achilles repair 6/3/21 by Dr. Letty Dailey      Date of Service: 6/9/2021     PATIENT SUMMARY   Antonella Roberts is a 42year old adult who presents to therapy today s/p right ach Signs and symptoms are consistent with diagnosis s/p achilles tendon repair. Pt and PT discussed evaluation findings, pathology, POC and HEP. Pt voiced understanding and performs HEP correctly without reported pain.  Skilled Physical Therapy is medically n throughout for improved ankle control with ADLs such as prolonged gait and stair negotiation (  · Pt will have improved SLS to >30s for increased ankle stability with ambulation on uneven surfaces such as gravel and grass   · Pt will report <2/10 pain with

## 2021-06-14 ENCOUNTER — OFFICE VISIT (OUTPATIENT)
Dept: PHYSICAL THERAPY | Age: 46
End: 2021-06-14
Attending: ORTHOPAEDIC SURGERY
Payer: COMMERCIAL

## 2021-06-14 PROCEDURE — 97016 VASOPNEUMATIC DEVICE THERAPY: CPT

## 2021-06-14 PROCEDURE — 97110 THERAPEUTIC EXERCISES: CPT

## 2021-06-14 NOTE — PROGRESS NOTES
Dx:   s/p right achilles repair 6/3/21 by Dr. Bisi Panda (Authorized # of Visits):  24 recommended            Authorizing Physician: Dr. Linda Moise  Next MD visit: none scheduled  Fall Risk: standard         Precautions: n/a

## 2021-06-16 ENCOUNTER — OFFICE VISIT (OUTPATIENT)
Dept: PHYSICAL THERAPY | Age: 46
End: 2021-06-16
Attending: ORTHOPAEDIC SURGERY
Payer: COMMERCIAL

## 2021-06-16 DIAGNOSIS — S86.011A RUPTURE OF RIGHT ACHILLES TENDON, INITIAL ENCOUNTER: ICD-10-CM

## 2021-06-16 PROCEDURE — 97110 THERAPEUTIC EXERCISES: CPT

## 2021-06-16 PROCEDURE — 97016 VASOPNEUMATIC DEVICE THERAPY: CPT

## 2021-06-16 NOTE — PROGRESS NOTES
Dx:   s/p right achilles repair 6/3/21 by Dr. Berna Amado (Authorized # of Visits):  24 recommended            Authorizing Physician: Dr. Edelmira Coy  Next MD visit: none scheduled  Fall Risk: standard         Precautions: n/a Total Timed Treatment: 45 min  Total Treatment Time: 45 min

## 2021-06-21 ENCOUNTER — OFFICE VISIT (OUTPATIENT)
Dept: PHYSICAL THERAPY | Age: 46
End: 2021-06-21
Attending: ORTHOPAEDIC SURGERY
Payer: COMMERCIAL

## 2021-06-21 PROCEDURE — 97110 THERAPEUTIC EXERCISES: CPT

## 2021-06-21 PROCEDURE — 97016 VASOPNEUMATIC DEVICE THERAPY: CPT

## 2021-06-21 NOTE — PROGRESS NOTES
Dx:   s/p right achilles repair 6/3/21 by Dr. Loly Gibbons (Authorized # of Visits):  24 recommended            Authorizing Physician: Dr. Elizabeth Washington  Next MD visit: none scheduled  Fall Risk: standard         Precautions: n/a toe ext 2x10  YTB DF 2x10  YTB inv/ever 2x10 each  Prone PF AROM 2x10   YTB PF neutral to 20 deg approx 2x10      vasom comp 15 min, low com, 38 deg  vasocomp 15 min, mod comp 48 deg  Vaso comp 15 min, 48 deg      HEP: ankle AROM DF, inv/ever, toe flexion/

## 2021-06-24 ENCOUNTER — APPOINTMENT (OUTPATIENT)
Dept: PHYSICAL THERAPY | Age: 46
End: 2021-06-24
Attending: ORTHOPAEDIC SURGERY
Payer: COMMERCIAL

## 2021-06-28 ENCOUNTER — APPOINTMENT (OUTPATIENT)
Dept: PHYSICAL THERAPY | Age: 46
End: 2021-06-28
Attending: ORTHOPAEDIC SURGERY
Payer: COMMERCIAL

## 2021-06-30 ENCOUNTER — OFFICE VISIT (OUTPATIENT)
Dept: PHYSICAL THERAPY | Age: 46
End: 2021-06-30
Attending: ORTHOPAEDIC SURGERY
Payer: COMMERCIAL

## 2021-06-30 PROCEDURE — 97110 THERAPEUTIC EXERCISES: CPT

## 2021-06-30 NOTE — PROGRESS NOTES
Dx:   s/p right achilles repair 6/3/21 by Dr. Vikash Payton (Authorized # of Visits):  24 recommended            Authorizing Physician: Dr. Jayde Churchill MD visit: none scheduled  Fall Risk: standard         Precautions: n/a AROM DF/PF  Inv/eversion X10 each  X10 each  X10  X10  X10  X10   Seated AP board 3x10 L1    Seated toe flexion, 2x10, seated toe ext 2x10  YTB DF 2x10  YTB inv/ever 2x10 each  Prone PF AROM 2x10   YTB PF neutral to 20 deg approx 2x10  BFR LE 80% SLR x30,

## 2021-07-08 ENCOUNTER — OFFICE VISIT (OUTPATIENT)
Dept: PHYSICAL THERAPY | Age: 46
End: 2021-07-08
Attending: ORTHOPAEDIC SURGERY
Payer: COMMERCIAL

## 2021-07-08 PROCEDURE — 97110 THERAPEUTIC EXERCISES: CPT

## 2021-07-08 NOTE — PROGRESS NOTES
Dx:   s/p right achilles repair 6/3/21 by Dr. Gordon Ashley (Authorized # of Visits):  24 recommended            Authorizing Physician: Dr. Susan Bunch  Next MD visit: none scheduled  Fall Risk: standard         Precautions: n/a            P participate in planning and for this course of care. Thank you for your referral. If you have any questions, please contact me at Dept: [phone].     Sincerely,  Electronically signed by therapist: Dereje Miller, PT     [de-identified] certification re Charges: there ex 3       Total Timed Treatment: 45 min  Total Treatment Time: 45 min

## 2021-07-09 ENCOUNTER — OFFICE VISIT (OUTPATIENT)
Dept: ORTHOPEDICS CLINIC | Facility: CLINIC | Age: 46
End: 2021-07-09
Payer: COMMERCIAL

## 2021-07-09 VITALS — HEART RATE: 69 BPM | OXYGEN SATURATION: 98 %

## 2021-07-09 DIAGNOSIS — S86.011D RUPTURE OF RIGHT ACHILLES TENDON, SUBSEQUENT ENCOUNTER: Primary | ICD-10-CM

## 2021-07-09 PROCEDURE — 99024 POSTOP FOLLOW-UP VISIT: CPT | Performed by: ORTHOPAEDIC SURGERY

## 2021-07-09 NOTE — PROGRESS NOTES
[facility]  [address] [place] [phone]     Name: Nessa Ross   MRN: [id number]  Date: 7/9/2021    REASON FOR VISIT: Second Post-Surgical Visit  Date of Surgery: 6/3/2020julio cesar distally. No obvious peripheral edema noted. Distal neurovascular exam demonstrates normal perfusion, intact sensation to light touch.          IMPRESSION: Kamla Reyes is a 42year old adult 5 weeks s/p right Achilles tendon repair, doing well witho

## 2021-07-12 ENCOUNTER — OFFICE VISIT (OUTPATIENT)
Dept: PHYSICAL THERAPY | Age: 46
End: 2021-07-12
Attending: ORTHOPAEDIC SURGERY
Payer: COMMERCIAL

## 2021-07-12 PROCEDURE — 97110 THERAPEUTIC EXERCISES: CPT

## 2021-07-12 NOTE — PROGRESS NOTES
Dx:   s/p right achilles repair 6/3/21 by Dr. William Beaulieu (Authorized # of Visits):  24 recommended            Authorizing Physician: Dr. Alexis Lee  Next MD visit: none scheduled  Fall Risk: standard         Precautions: n/a              Subject and ankle 10 min  10 min  PROM toes and forefoot and ankle  5 min  5 min  Wt shifts in bars x10    Ankle AROM DF/PF  Inv/eversion X10 each  X10 each  X10  X10  X10  X10   Seated AP board 3x10 L1  x20  x20  L2 2x20  L3 x20  Seated AP board 2x15  Seated DL h

## 2021-07-14 ENCOUNTER — OFFICE VISIT (OUTPATIENT)
Dept: PHYSICAL THERAPY | Age: 46
End: 2021-07-14
Attending: ORTHOPAEDIC SURGERY
Payer: COMMERCIAL

## 2021-07-14 PROCEDURE — 97110 THERAPEUTIC EXERCISES: CPT

## 2021-07-14 PROCEDURE — 97016 VASOPNEUMATIC DEVICE THERAPY: CPT

## 2021-07-14 NOTE — PROGRESS NOTES
Dx:   s/p right achilles repair 6/3/21 by Dr. Karely Ca (Authorized # of Visits):  24 recommended            Authorizing Physician: Dr. Yue Farah  Next MD visit: none scheduled  Fall Risk: standard         Precautions: n/a              Subject x10  Standing marching in shoes in bars 2x10    X10 each  X10 each  X10  X10  X10  X10   Seated AP board 3x10 L1  x20  x20  L2 2x20  L3 x20  Seated AP board 2x15  Seated DL heel rise 2x15  2x15  2x15    Prone PF AROM 2x10   YTB PF neutral to 20 deg approx

## 2021-07-19 ENCOUNTER — OFFICE VISIT (OUTPATIENT)
Dept: PHYSICAL THERAPY | Age: 46
End: 2021-07-19
Attending: ORTHOPAEDIC SURGERY
Payer: COMMERCIAL

## 2021-07-19 PROCEDURE — 97016 VASOPNEUMATIC DEVICE THERAPY: CPT

## 2021-07-19 PROCEDURE — 97110 THERAPEUTIC EXERCISES: CPT

## 2021-07-19 NOTE — PROGRESS NOTES
Dx:   s/p right achilles repair 6/3/21 by Dr. Ambar Campos (Authorized # of Visits):  24 recommended            Authorizing Physician: Dr. Sasha Fong  Next MD visit: none scheduled  Fall Risk: standard         Precautions: n/a              Subject crutches 7 min  Gait training WBAT in CAM boot fwd/bwd  Partial WB in parallel bars in shoe 6 laps  Gait w/ shoe and 1 heel lift 5 min    PROM toes and forefoot and ankle  5 min  5 min  Wt shifts in bars x10  Standing marching in shoes in bars 2x10  STM ga

## 2021-07-21 ENCOUNTER — OFFICE VISIT (OUTPATIENT)
Dept: PHYSICAL THERAPY | Age: 46
End: 2021-07-21
Attending: ORTHOPAEDIC SURGERY
Payer: COMMERCIAL

## 2021-07-21 PROCEDURE — 97110 THERAPEUTIC EXERCISES: CPT

## 2021-07-21 PROCEDURE — 97140 MANUAL THERAPY 1/> REGIONS: CPT

## 2021-07-21 NOTE — PROGRESS NOTES
Dx:   s/p right achilles repair 6/3/21 by Dr. Camryn Arriola (Authorized # of Visits):  24 recommended            Authorizing Physician: Dr. Milena Worthy  Next MD visit: none scheduled  Fall Risk: standard         Precautions: n/a             Progress visits over a 90 day period. Treatment will include: there ex within post op protocol of Dr. Lopez Daily, gait training, manual therapy as needed, neuro muscular re-ed including balance and proprioceptive training.         Patient/Family/Caregiver was advised of PROM/mob 5 min    BFR LE 80% SLR 1# x30, x15, no weight 2x15  Standing in shoes/parallel bars mini squat 2x15   Green theraband 4 ways, 2x10 each  Gait in shoe in bars, 5 min   Mini squats 2x10   ASU  4/6 inch steps x10 on R LE w/ one UE assist   March in

## 2021-07-26 ENCOUNTER — OFFICE VISIT (OUTPATIENT)
Dept: PHYSICAL THERAPY | Age: 46
End: 2021-07-26
Attending: ORTHOPAEDIC SURGERY
Payer: COMMERCIAL

## 2021-07-26 PROCEDURE — 97110 THERAPEUTIC EXERCISES: CPT

## 2021-07-26 PROCEDURE — 97016 VASOPNEUMATIC DEVICE THERAPY: CPT

## 2021-07-26 NOTE — PROGRESS NOTES
Dx:   s/p right achilles repair 6/3/21 by Dr. Despina Castleman (Authorized # of Visits):  24 recommended            Authorizing Physician: Dr. Anamaria Trejo  Next MD visit: none scheduled  Fall Risk: standard         Precautions: n/a              Subject Seated AP board 2x15  Seated DL heel rise 2x15  2x15  2x15  2x20  x20   Seated heel rise 10# weight R LE 3x10  Standing marching x10   squats 2x10 in bars  Seated SL heel rise 15# x15, 20# 2x15    Seated PF 2x10   BFR R LE SLR x30, 3x15   BFR R LE prone PF

## 2021-07-27 ENCOUNTER — TELEPHONE (OUTPATIENT)
Dept: PHYSICAL THERAPY | Facility: HOSPITAL | Age: 46
End: 2021-07-27

## 2021-07-28 ENCOUNTER — OFFICE VISIT (OUTPATIENT)
Dept: PHYSICAL THERAPY | Age: 46
End: 2021-07-28
Attending: ORTHOPAEDIC SURGERY
Payer: COMMERCIAL

## 2021-07-28 PROCEDURE — 97016 VASOPNEUMATIC DEVICE THERAPY: CPT

## 2021-07-28 PROCEDURE — 97110 THERAPEUTIC EXERCISES: CPT

## 2021-07-28 NOTE — PROGRESS NOTES
Dx:   s/p right achilles repair 6/3/21 by Dr. Despina Castleman (Authorized # of Visits):  24 recommended            Authorizing Physician: Dr. Anamaria Trejo  Next MD visit: none scheduled  Fall Risk: standard         Precautions: n/a              Subject gastroc/soleus 10 min  Seated AP board 2x15  x20 L3  Standing L1/L2 x20 w/ and without hands each    Seated AP board 2x15  Seated DL heel rise 2x15  2x15  2x15  2x20  x20   Seated heel rise 10# weight R LE 3x10  Standing marching x10   squats 2x10 in bars

## 2021-08-02 ENCOUNTER — OFFICE VISIT (OUTPATIENT)
Dept: PHYSICAL THERAPY | Age: 46
End: 2021-08-02
Attending: ORTHOPAEDIC SURGERY
Payer: COMMERCIAL

## 2021-08-02 PROCEDURE — 97110 THERAPEUTIC EXERCISES: CPT

## 2021-08-02 PROCEDURE — 97140 MANUAL THERAPY 1/> REGIONS: CPT

## 2021-08-02 PROCEDURE — 97016 VASOPNEUMATIC DEVICE THERAPY: CPT

## 2021-08-02 NOTE — PROGRESS NOTES
Dx:   s/p right achilles repair 6/3/21 by Dr. Amy Little (Authorized # of Visits):  24 recommended            Authorizing Physician: Dr. Tahira Cavanaugh  Next MD visit: none scheduled  Fall Risk: standard         Precautions: n/a              Subject L2/L3 x20 w/o hands AP board    2x15  2x15  2x20  x20   Seated heel rise 10# weight R LE 3x10  Standing marching x10   squats 2x10 in bars  Seated SL heel rise 15# x15, 20# 2x15  DL heel rise in bars 2x10 w/ UE assist  Med/lat AP board 2x10 w/ 30 sec hold

## 2021-08-04 ENCOUNTER — OFFICE VISIT (OUTPATIENT)
Dept: PHYSICAL THERAPY | Age: 46
End: 2021-08-04
Attending: ORTHOPAEDIC SURGERY
Payer: COMMERCIAL

## 2021-08-04 PROCEDURE — 97110 THERAPEUTIC EXERCISES: CPT

## 2021-08-04 PROCEDURE — 97016 VASOPNEUMATIC DEVICE THERAPY: CPT

## 2021-08-04 NOTE — PROGRESS NOTES
Dx:   s/p right achilles repair 6/3/21 by Dr. Despina Castleman (Authorized # of Visits):  24 recommended            Authorizing Physician: Dr. Anamaria Trejo  Next MD visit: none scheduled  Fall Risk: standard         Precautions: n/a              Subject hold 2 bouts  SL balance with cone taps 3 rounds each    Gait in shoe in bars, 5 min   Mini squats 2x10   ASU  4/6 inch steps x10 on R LE w/ one UE assist   March in bars 2x15  Standing DL heel rise in bars, partial ROM 2x10  Tandem balance 3x30 B   Standi

## 2021-08-09 ENCOUNTER — OFFICE VISIT (OUTPATIENT)
Dept: PHYSICAL THERAPY | Age: 46
End: 2021-08-09
Attending: ORTHOPAEDIC SURGERY
Payer: COMMERCIAL

## 2021-08-09 PROCEDURE — 97016 VASOPNEUMATIC DEVICE THERAPY: CPT

## 2021-08-09 PROCEDURE — 97110 THERAPEUTIC EXERCISES: CPT

## 2021-08-09 NOTE — PROGRESS NOTES
Dx:   s/p right achilles repair 6/3/21 by Dr. Ambar Campos (Authorized # of Visits):  24 recommended            Authorizing Physician: Dr. Sahsa Fong  Next MD visit: none scheduled  Fall Risk: standard         Precautions: n/a              Subject Seated SL heel rise 15# x15, 20# 2x15  DL heel rise in bars 2x10 w/ UE assist  Med/lat AP board 2x10 w/ 30 sec hold 2 bouts  SL balance with cone taps 3 rounds each  bosu squat 2x15   SL squat off chair 2x10 B    Tandem balance 3x30 B   Standing AP L1 2x15

## 2021-08-11 ENCOUNTER — APPOINTMENT (OUTPATIENT)
Dept: PHYSICAL THERAPY | Age: 46
End: 2021-08-11
Attending: ORTHOPAEDIC SURGERY
Payer: COMMERCIAL

## 2021-08-12 ENCOUNTER — OFFICE VISIT (OUTPATIENT)
Dept: FAMILY MEDICINE CLINIC | Facility: CLINIC | Age: 46
End: 2021-08-12
Payer: COMMERCIAL

## 2021-08-12 VITALS
HEART RATE: 62 BPM | RESPIRATION RATE: 16 BRPM | WEIGHT: 258.5 LBS | HEIGHT: 75 IN | TEMPERATURE: 98 F | BODY MASS INDEX: 32.14 KG/M2 | DIASTOLIC BLOOD PRESSURE: 86 MMHG | SYSTOLIC BLOOD PRESSURE: 122 MMHG | OXYGEN SATURATION: 99 %

## 2021-08-12 DIAGNOSIS — B35.1 ONYCHOMYCOSIS: ICD-10-CM

## 2021-08-12 DIAGNOSIS — Z00.00 ANNUAL PHYSICAL EXAM: Primary | ICD-10-CM

## 2021-08-12 DIAGNOSIS — Z13.31 NEGATIVE DEPRESSION SCREENING: ICD-10-CM

## 2021-08-12 DIAGNOSIS — E55.9 VITAMIN D DEFICIENCY: ICD-10-CM

## 2021-08-12 PROCEDURE — 3074F SYST BP LT 130 MM HG: CPT | Performed by: FAMILY MEDICINE

## 2021-08-12 PROCEDURE — 3079F DIAST BP 80-89 MM HG: CPT | Performed by: FAMILY MEDICINE

## 2021-08-12 PROCEDURE — 3008F BODY MASS INDEX DOCD: CPT | Performed by: FAMILY MEDICINE

## 2021-08-12 PROCEDURE — 99396 PREV VISIT EST AGE 40-64: CPT | Performed by: FAMILY MEDICINE

## 2021-08-12 RX ORDER — TERBINAFINE HYDROCHLORIDE 250 MG/1
250 TABLET ORAL DAILY
Qty: 90 TABLET | Refills: 1 | Status: SHIPPED | OUTPATIENT
Start: 2021-08-12

## 2021-08-12 NOTE — PROGRESS NOTES
Laxmi Avendano is a 42year old adult that presents for annual physical exam.     Patient presents with:  Physical      Diet and exercise: healthy diet, peleton  STI testing desired: no  Colonoscopy: No recommendations at this time   Vaccines: utd     [facility] education: Not on file      Highest education level: Not on file    Occupational History        Employer: [employer]    Tobacco Use      Smoking status: Never Smoker      Smokeless tobacco: Never Used    Vaping Use      Vaping Use: Never used    Sub lesions  EYES: denies blurred vision or double vision  HEENT: denies nasal congestion, sinus pain or ST  LUNGS: denies shortness of breath with exertion  CARDIOVASCULAR: denies chest pain on exertion  GI: denies abdominal pain, denies heartburn  : denies Future  - CBC WITH DIFFERENTIAL WITH PLATELET; Future  - TSH W REFLEX TO FREE T4; Future  - COMP METABOLIC PANEL (14); Future  - continue to work on healthy diet and regular exercise    2. Negative depression screening    3.  Onychomycosis  - PODIATRY - INT

## 2021-08-12 NOTE — PATIENT INSTRUCTIONS
Thank you for allowing me to participate in your care today. I will contact you with any results from today's visit. Lab results are typically available in 2-3 days for blood tests, and 3-5 days for any cultures or Paps.    Please let me know if you hav HIV All men At routine exams   Obesity All men in this age group At routine exams   Prostate cancer Starting at age 39, talk to healthcare provider about risks and benefits of digital rectal exam (MANDO) and prostate-specific antigen (PSA) screening1 At ro pneumococcal bacteria)      Tetanus/diphtheria/  pertussis (Td/Tdap) booster All men in this age group Td every 10 years, or a one-time dose of Tdap instead of a Td booster after age 25, then Td every 10 years   Counseling Who needs it How often   Diet and

## 2021-08-16 ENCOUNTER — TELEPHONE (OUTPATIENT)
Dept: ORTHOPEDICS CLINIC | Facility: CLINIC | Age: 46
End: 2021-08-16

## 2021-08-16 ENCOUNTER — APPOINTMENT (OUTPATIENT)
Dept: PHYSICAL THERAPY | Age: 46
End: 2021-08-16
Attending: ORTHOPAEDIC SURGERY
Payer: COMMERCIAL

## 2021-08-16 NOTE — TELEPHONE ENCOUNTER
Patient coming in for Rt big toe onychomycosis, no imaging has been done on the rt foot, if needed please place Rx.     Future Appointments   Date Time Provider Ganesh Merrill   8/17/2021  8:30 AM REFERENCE [id number]   8/18/2021  5:15 P

## 2021-08-17 ENCOUNTER — LAB ENCOUNTER (OUTPATIENT)
Dept: LAB | Age: 46
End: 2021-08-17
Attending: FAMILY MEDICINE
Payer: COMMERCIAL

## 2021-08-17 DIAGNOSIS — Z00.00 ANNUAL PHYSICAL EXAM: ICD-10-CM

## 2021-08-17 DIAGNOSIS — E55.9 VITAMIN D DEFICIENCY: ICD-10-CM

## 2021-08-17 LAB
ALBUMIN SERPL-MCNC: 4.2 G/DL (ref 3.4–5)
ALBUMIN/GLOB SERPL: 1.2 {RATIO} (ref 1–2)
ALP LIVER SERPL-CCNC: 96 U/L
ALT SERPL-CCNC: 59 U/L
ANION GAP SERPL CALC-SCNC: 5 MMOL/L (ref 0–18)
AST SERPL-CCNC: 32 U/L (ref 15–37)
BASOPHILS # BLD AUTO: 0.02 X10(3) UL (ref 0–0.2)
BASOPHILS NFR BLD AUTO: 0.4 %
BILIRUB SERPL-MCNC: 0.7 MG/DL (ref 0.1–2)
BUN BLD-MCNC: 13 MG/DL (ref 7–18)
CALCIUM BLD-MCNC: 9.1 MG/DL (ref 8.5–10.1)
CHLORIDE SERPL-SCNC: 106 MMOL/L (ref 98–112)
CHOLEST SMN-MCNC: 223 MG/DL (ref ?–200)
CO2 SERPL-SCNC: 28 MMOL/L (ref 21–32)
CREAT BLD-MCNC: 1.17 MG/DL
EOSINOPHIL # BLD AUTO: 0.15 X10(3) UL (ref 0–0.7)
EOSINOPHIL NFR BLD AUTO: 3.1 %
ERYTHROCYTE [DISTWIDTH] IN BLOOD BY AUTOMATED COUNT: 12.8 %
GLOBULIN PLAS-MCNC: 3.5 G/DL (ref 2.8–4.4)
GLUCOSE BLD-MCNC: 97 MG/DL (ref 70–99)
HCT VFR BLD AUTO: 49.8 %
HDLC SERPL-MCNC: 46 MG/DL (ref 40–59)
HGB BLD-MCNC: 16.6 G/DL
IMM GRANULOCYTES # BLD AUTO: 0.01 X10(3) UL (ref 0–1)
IMM GRANULOCYTES NFR BLD: 0.2 %
LDLC SERPL CALC-MCNC: 149 MG/DL (ref ?–100)
LYMPHOCYTES # BLD AUTO: 1.06 X10(3) UL (ref 1–4)
LYMPHOCYTES NFR BLD AUTO: 21.9 %
M PROTEIN MFR SERPL ELPH: 7.7 G/DL (ref 6.4–8.2)
MCH RBC QN AUTO: 30.2 PG (ref 26–34)
MCHC RBC AUTO-ENTMCNC: 33.3 G/DL (ref 31–37)
MCV RBC AUTO: 90.5 FL
MONOCYTES # BLD AUTO: 0.39 X10(3) UL (ref 0.1–1)
MONOCYTES NFR BLD AUTO: 8.1 %
NEUTROPHILS # BLD AUTO: 3.2 X10 (3) UL (ref 1.5–7.7)
NEUTROPHILS # BLD AUTO: 3.2 X10(3) UL (ref 1.5–7.7)
NEUTROPHILS NFR BLD AUTO: 66.3 %
NONHDLC SERPL-MCNC: 177 MG/DL (ref ?–130)
OSMOLALITY SERPL CALC.SUM OF ELEC: 288 MOSM/KG (ref 275–295)
PATIENT FASTING Y/N/NP: YES
PATIENT FASTING Y/N/NP: YES
PLATELET # BLD AUTO: 252 10(3)UL (ref 150–450)
POTASSIUM SERPL-SCNC: 4.8 MMOL/L (ref 3.5–5.1)
RBC # BLD AUTO: 5.5 X10(6)UL
SODIUM SERPL-SCNC: 139 MMOL/L (ref 136–145)
TRIGL SERPL-MCNC: 157 MG/DL (ref 30–149)
TSI SER-ACNC: 1.39 MIU/ML (ref 0.36–3.74)
VIT D+METAB SERPL-MCNC: 35.3 NG/ML (ref 30–100)
VLDLC SERPL CALC-MCNC: 30 MG/DL (ref 0–30)
WBC # BLD AUTO: 4.8 X10(3) UL (ref 4–11)

## 2021-08-17 PROCEDURE — 36415 COLL VENOUS BLD VENIPUNCTURE: CPT

## 2021-08-17 PROCEDURE — 82306 VITAMIN D 25 HYDROXY: CPT

## 2021-08-17 PROCEDURE — 85025 COMPLETE CBC W/AUTO DIFF WBC: CPT

## 2021-08-17 PROCEDURE — 80053 COMPREHEN METABOLIC PANEL: CPT

## 2021-08-17 PROCEDURE — 80061 LIPID PANEL: CPT

## 2021-08-17 PROCEDURE — 84443 ASSAY THYROID STIM HORMONE: CPT

## 2021-08-18 ENCOUNTER — OFFICE VISIT (OUTPATIENT)
Dept: PHYSICAL THERAPY | Age: 46
End: 2021-08-18
Attending: ORTHOPAEDIC SURGERY
Payer: COMMERCIAL

## 2021-08-18 PROCEDURE — 97140 MANUAL THERAPY 1/> REGIONS: CPT

## 2021-08-18 PROCEDURE — 97110 THERAPEUTIC EXERCISES: CPT

## 2021-08-18 NOTE — PROGRESS NOTES
Dx:   s/p right achilles repair 6/3/21 by Dr. Erin Cruz (Authorized # of Visits):  24 recommended            Authorizing Physician: Dr. Saida Veloz  Next MD visit: none scheduled  Fall Risk: standard         Precautions: n/a              Subject miles  2 mile warm up  Warm up prior to session  2 mile warm up  4 mile warm up prior to session    x20 L3  Standing L1/L2 x20 w/ and without hands each  L2/L3 x20 w/o hands AP board  X10 each  x20 each  bosu squat 2x15  Split squat 2x10 B    Seated SL rei Time: 60 min

## 2021-08-23 ENCOUNTER — APPOINTMENT (OUTPATIENT)
Dept: PHYSICAL THERAPY | Age: 46
End: 2021-08-23
Attending: ORTHOPAEDIC SURGERY
Payer: COMMERCIAL

## 2021-08-25 ENCOUNTER — OFFICE VISIT (OUTPATIENT)
Dept: PHYSICAL THERAPY | Age: 46
End: 2021-08-25
Attending: ORTHOPAEDIC SURGERY
Payer: COMMERCIAL

## 2021-08-25 PROCEDURE — 97140 MANUAL THERAPY 1/> REGIONS: CPT

## 2021-08-25 PROCEDURE — 97110 THERAPEUTIC EXERCISES: CPT

## 2021-08-25 NOTE — PROGRESS NOTES
Dx:   s/p right achilles repair 6/3/21 by Dr. Camryn Arriola (Authorized # of Visits):  24 recommended            Authorizing Physician: Dr. Milena Worthy  Next MD visit: none scheduled  Fall Risk: standard         Precautions: n/a              Subject 7/28/2021  tx 13  8/2/2021  tx 14  8/4/2021  tx  8/9/2021  tx 16  8/18/2021  tx 17 8/25/2021  tx 18    2 miles  2 mile warm up  Warm up prior to session  2 mile warm up  4 mile warm up prior to session  4 miles warm up prior to session    Standing L1 toe flexion/extension     Charges: there ex 3 MT 1     Total Timed Treatment: 60 min  Total Treatment Time: 60 min

## 2021-09-02 ENCOUNTER — OFFICE VISIT (OUTPATIENT)
Dept: PHYSICAL THERAPY | Age: 46
End: 2021-09-02
Attending: FAMILY MEDICINE
Payer: COMMERCIAL

## 2021-09-02 PROCEDURE — 97110 THERAPEUTIC EXERCISES: CPT

## 2021-09-02 PROCEDURE — 97140 MANUAL THERAPY 1/> REGIONS: CPT

## 2021-09-02 NOTE — PROGRESS NOTES
Dx:   s/p right achilles repair 6/3/21 by Dr. Connor Rico (Authorized # of Visits):  24 recommended            Authorizing Physician: Dr. Elen Naylor  Next MD visit: none scheduled  Fall Risk: standard         Precautions: n/a              Subject squat, walking lunges  SL AP board and SL balance out of shoe           Certification From: 1/64/4626  To:10/19/2021        8/2/2021  tx 14  8/4/2021  tx  8/9/2021  tx 16  8/18/2021  tx 17 8/25/2021  tx 18  9/2/2021  tx 19    2 mile warm up  Warm up prior to medial achilles, total 8 min    ---- Vaso comp 10 min mod comp 40 deg  Vaso comp 10 min 40 deg, mod comp  STM achilles and soleus 8 min  Calcaneal mob, med/lat gr 3, 3x10 bouts  Pt ed and HEP update   HEP: ankle AROM DF, inv/ever, toe flexion/extension

## 2021-09-09 ENCOUNTER — OFFICE VISIT (OUTPATIENT)
Dept: PHYSICAL THERAPY | Age: 46
End: 2021-09-09
Attending: ORTHOPAEDIC SURGERY
Payer: COMMERCIAL

## 2021-09-09 PROCEDURE — 97110 THERAPEUTIC EXERCISES: CPT

## 2021-09-09 PROCEDURE — 97140 MANUAL THERAPY 1/> REGIONS: CPT

## 2021-09-09 NOTE — PROGRESS NOTES
Dx:   s/p right achilles repair 6/3/21 by Dr. Sherrie Jones (Authorized # of Visits):  24 recommended            Authorizing Physician: Dr. Christiano Parks  Next MD visit: none scheduled  Fall Risk: standard         Precautions: n/a             Progress to maintain progress achieved in PT  · Pt to demo ability to do 5 SL heel rises without UE assist on R LE      Plan: Continue skilled Physical Therapy 1-2 x/week or a total of 12 visits over a 90 day period.  Treatment will include: there ex within post op Squat to heel rise 3x15 2x15 bosu squat  2x15 squat to heel rise   MT: DTM gastroc/soleus 8 min    SL balance with cone taps 3 rounds each  bosu squat 2x15   SL squat off chair 2x10 B  TRX reverse lunge 2x10 B  SL lateral disc lunge 2x15 B  SL AP board 2x1

## 2021-09-10 ENCOUNTER — TELEPHONE (OUTPATIENT)
Dept: PHYSICAL THERAPY | Facility: HOSPITAL | Age: 46
End: 2021-09-10

## 2021-09-10 ENCOUNTER — OFFICE VISIT (OUTPATIENT)
Dept: ORTHOPEDICS CLINIC | Facility: CLINIC | Age: 46
End: 2021-09-10

## 2021-09-10 ENCOUNTER — TELEPHONE (OUTPATIENT)
Dept: ORTHOPEDICS CLINIC | Facility: CLINIC | Age: 46
End: 2021-09-10

## 2021-09-10 VITALS — OXYGEN SATURATION: 98 % | HEART RATE: 57 BPM

## 2021-09-10 DIAGNOSIS — S86.011D RUPTURE OF RIGHT ACHILLES TENDON, SUBSEQUENT ENCOUNTER: Primary | ICD-10-CM

## 2021-09-10 DIAGNOSIS — S86.011A RUPTURE OF RIGHT ACHILLES TENDON, INITIAL ENCOUNTER: Primary | ICD-10-CM

## 2021-09-10 PROCEDURE — 99213 OFFICE O/P EST LOW 20 MIN: CPT | Performed by: ORTHOPAEDIC SURGERY

## 2021-09-10 NOTE — TELEPHONE ENCOUNTER
Deven Abrams from PT at [facility] office called and is asking to get 12 more additional visits for the pt.   Fax: [phone]  Tel: [phone]  Thanks

## 2021-09-10 NOTE — PROGRESS NOTES
[facility] - ORTHOPEDICS  [address] Nikhil Ramires     Name: Benton Brabosa   MRN: [id number]  Date: 9/10/2021    REASON FOR VISIT: Third Post-Surgical Visit  Date of Surgery: 6/3/2020–juan erythema or abnormality. Slight bulkiness at Achilles repair site. Patient is able to do a dual leg heel raise. No obvious peripheral edema noted. Distal neurovascular exam demonstrates normal perfusion, intact sensation to light touch.          IMPR

## 2021-09-13 ENCOUNTER — OFFICE VISIT (OUTPATIENT)
Dept: PHYSICAL THERAPY | Age: 46
End: 2021-09-13
Attending: ORTHOPAEDIC SURGERY
Payer: COMMERCIAL

## 2021-09-13 PROCEDURE — 97140 MANUAL THERAPY 1/> REGIONS: CPT

## 2021-09-13 PROCEDURE — 97110 THERAPEUTIC EXERCISES: CPT

## 2021-09-13 NOTE — PROGRESS NOTES
Dx:   s/p right achilles repair 6/3/21 by Dr. Ramsey Padilla (Authorized # of Visits):  24 recommended            Authorizing Physician: Dr. Pop Child  Next MD visit: none scheduled  Fall Risk: standard         Precautions: n/a               Subjec mile warm up prior to session  4 miles warm up prior to session  2 miles prior to session  Bike warm up 3 miles prior to start of session 2 mile warm up on bike   bosu squat 2x15  Split squat 2x10 B  Squat to heel rise 3x15 2x15 bosu squat  2x15 squat to h

## 2021-09-14 ENCOUNTER — OFFICE VISIT (OUTPATIENT)
Dept: ORTHOPEDICS CLINIC | Facility: CLINIC | Age: 46
End: 2021-09-14

## 2021-09-14 DIAGNOSIS — L60.1 ONYCHOLYSIS: Primary | ICD-10-CM

## 2021-09-14 PROCEDURE — 87107 FUNGI IDENTIFICATION MOLD: CPT | Performed by: PODIATRIST

## 2021-09-14 PROCEDURE — 87101 SKIN FUNGI CULTURE: CPT | Performed by: PODIATRIST

## 2021-09-14 PROCEDURE — 99213 OFFICE O/P EST LOW 20 MIN: CPT | Performed by: PODIATRIST

## 2021-09-14 NOTE — PROGRESS NOTES
[facility] Progress Note    Subjective:   82-year-old male here for evaluation of his right great toenail thick over the last 2 years. 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   He has been on oral Lamisil per his primary physician and is doing well on

## 2021-09-14 NOTE — PROGRESS NOTES
[facility] Progress Note    Subjective: Patient is here today to check his great toenail right foot. Thick hard to cut and in poor condition for about 2 years.     2 yr history of nail issue  No injury but rides bike -triathalon  Recent achilles r Diabetes Maternal Grandmother         DM   • Other (Other) Paternal Grandmother         PARKINSONS   • Heart Attack Paternal Grandfather         MI   • Arthritis Sister         RA     Social History    Occupational History        Employer: [id number]

## 2021-09-16 ENCOUNTER — OFFICE VISIT (OUTPATIENT)
Dept: PHYSICAL THERAPY | Age: 46
End: 2021-09-16
Attending: ORTHOPAEDIC SURGERY
Payer: COMMERCIAL

## 2021-09-16 PROCEDURE — 97110 THERAPEUTIC EXERCISES: CPT

## 2021-09-16 NOTE — PROGRESS NOTES
Dx:   s/p right achilles repair 6/3/21 by Dr. Despina Castleman (Authorized # of Visits):  29 O          Authorizing Physician: Dr. Anamaria Trejo  Next MD visit: none scheduled  Fall Risk: standard         Precautions: n/a               Subjective: noti miles warm up prior to session  2 miles prior to session  Bike warm up 3 miles prior to start of session 2 mile warm up on bike ellip 5 fwd and 2 bwd L1-2    bosu squat 2x15  Split squat 2x10 B  Squat to heel rise 3x15 2x15 bosu squat  2x15 squat to heel r leg leg on dynadisc 3x15     STM achilles and soleus 8 min  Calcaneal mob, med/lat gr 3, 3x10 bouts  Pt ed and HEP update --- Standing toe stretching/ROM flex/ extension 5 bouts each direction  Seated toe curls x30  Ankle AROM inversion/eversion 2x10    HE

## 2021-09-20 ENCOUNTER — OFFICE VISIT (OUTPATIENT)
Dept: PHYSICAL THERAPY | Age: 46
End: 2021-09-20
Attending: ORTHOPAEDIC SURGERY
Payer: COMMERCIAL

## 2021-09-20 PROCEDURE — 97110 THERAPEUTIC EXERCISES: CPT

## 2021-09-20 NOTE — PROGRESS NOTES
Dx:   s/p right achilles repair 6/3/21 by Dr. Lisa Boateng (Authorized # of Visits):  29 O          Authorizing Physician: Dr. Sammy Ferreira  Next MD visit: none scheduled  Fall Risk: standard         Precautions: n/a               Subjective: amy 9/13/2021  tx 21  9/16/2021  tx 22 9/20/2021  tx 23    4 miles warm up prior to session  2 miles prior to session  Bike warm up 3 miles prior to start of session 2 mile warm up on bike ellip 5 fwd and 2 bwd L1-2  4 fwd, 2 bwd L1-2    Squat to heel rise 3x1 leg on dynadisc 3x15   BFR LE 80% SL shuttle 2.5 cords x30, 3x15 3 cords     BFR 24 inch ASU x30, 3x15     BFR SL AP Board x30, 3x15   Calcaneal mob, med/lat gr 3, 3x10 bouts  Pt ed and HEP update --- Standing toe stretching/ROM flex/ extension 5 bouts eac

## 2021-09-22 ENCOUNTER — OFFICE VISIT (OUTPATIENT)
Dept: PHYSICAL THERAPY | Age: 46
End: 2021-09-22
Attending: ORTHOPAEDIC SURGERY
Payer: COMMERCIAL

## 2021-09-22 PROCEDURE — 97110 THERAPEUTIC EXERCISES: CPT

## 2021-09-22 NOTE — PROGRESS NOTES
Dx:   s/p right achilles repair 6/3/21 by Dr. Mary Jane Ac (Authorized # of Visits):  29 O          Authorizing Physician: Dr. Cameron Hoover  Next MD visit: none scheduled  Fall Risk: standard         Precautions: n/a               Subjective: no s miles prior to start of session 2 mile warm up on bike ellip 5 fwd and 2 bwd L1-2  4 fwd, 2 bwd L1-2  L2 ellip fwd 4 min bwd 2 min    MT: DTM gastroc/soleus 8 min  MT: DTM/STM gastroc/soleus and achilles 8 min  Dynamic warm up 2 laps each: Esther arias Charges: there ex 4    Total Timed Treatment: 55 min  Total Treatment Time: 55 min

## 2021-09-24 ENCOUNTER — PATIENT MESSAGE (OUTPATIENT)
Dept: FAMILY MEDICINE CLINIC | Facility: CLINIC | Age: 46
End: 2021-09-24

## 2021-09-24 NOTE — TELEPHONE ENCOUNTER
From: Valentina Torres  To: Laura Resendez DO  Sent: 9/24/2021 12:44 PM CDT  Subject: Robson Frederick,    I forgot to bring a required health form to my physical back on August 12th. It is needed for our next year's plan.  I am attaching it

## 2021-09-30 ENCOUNTER — OFFICE VISIT (OUTPATIENT)
Dept: PHYSICAL THERAPY | Age: 46
End: 2021-09-30
Attending: ORTHOPAEDIC SURGERY
Payer: COMMERCIAL

## 2021-09-30 PROCEDURE — 97110 THERAPEUTIC EXERCISES: CPT

## 2021-09-30 NOTE — PROGRESS NOTES
Dx:   s/p right achilles repair 6/3/21 by Dr. Domi Silver (Authorized # of Visits):  29 O          Authorizing Physician: Dr. Krysta Mahmood  Next MD visit: none scheduled  Fall Risk: standard         Precautions: n/a               Subjective: no s 9/16/2021  tx 22 9/20/2021  tx 23  9/22/2021  tx 24  9/30/2021  tx 25     Bike warm up 3 miles prior to start of session 2 mile warm up on bike ellip 5 fwd and 2 bwd L1-2  4 fwd, 2 bwd L1-2  L2 ellip fwd 4 min bwd 2 min  L2 ellip 5 min fwd, 2 bwd   STM 5 m 25# SL heel rise x30, 3x15  BFR LE 80% SL shuttle heel rise 3 cords, x30, 3x15  BFR 24nch ASU x30, 3x15  BFR seated 25# x30, 3x15    --- Standing toe stretching/ROM flex/ extension 5 bouts each direction  Seated toe curls x30  Ankle AROM inversion/eversion

## 2021-10-05 ENCOUNTER — PATIENT MESSAGE (OUTPATIENT)
Dept: FAMILY MEDICINE CLINIC | Facility: CLINIC | Age: 46
End: 2021-10-05

## 2021-10-05 NOTE — TELEPHONE ENCOUNTER
From: Court Bsas  To: Lupillo Barnes DO  Sent: 10/5/2021 9:15 AM CDT  Subject: Flu Vaccine    Does the office have walk in flu vaccine available? Was looking to get my vaccine, and the O routes us through our PCP.

## 2021-10-05 NOTE — TELEPHONE ENCOUNTER
From: Sharon Camarena  To: Najma Sharif, DO  Sent: 10/5/2021 9:15 AM CDT  Subject: Flu Vaccine    Does the office have walk in flu vaccine available? Was looking to get my vaccine, and the HMO routes us through our PCP.

## 2021-10-06 ENCOUNTER — IMMUNIZATION (OUTPATIENT)
Dept: FAMILY MEDICINE CLINIC | Facility: CLINIC | Age: 46
End: 2021-10-06
Payer: COMMERCIAL

## 2021-10-06 DIAGNOSIS — Z23 NEED FOR VACCINATION: Primary | ICD-10-CM

## 2021-10-06 PROCEDURE — 90471 IMMUNIZATION ADMIN: CPT | Performed by: FAMILY MEDICINE

## 2021-10-06 PROCEDURE — 90686 IIV4 VACC NO PRSV 0.5 ML IM: CPT | Performed by: FAMILY MEDICINE

## 2021-10-07 ENCOUNTER — OFFICE VISIT (OUTPATIENT)
Dept: PHYSICAL THERAPY | Age: 46
End: 2021-10-07
Attending: ORTHOPAEDIC SURGERY
Payer: COMMERCIAL

## 2021-10-07 PROCEDURE — 97110 THERAPEUTIC EXERCISES: CPT

## 2021-10-07 NOTE — PROGRESS NOTES
Dx:   s/p right achilles repair 6/3/21 by Dr. Anastacio Lou (Authorized # of Visits):  29 O          Authorizing Physician: Dr. Cristel Herrera  Next MD visit: none scheduled  Fall Risk: standard         Precautions: n/a               Subjective: no s fwd and 2 bwd L1-2  4 fwd, 2 bwd L1-2  L2 ellip fwd 4 min bwd 2 min  L2 ellip 5 min fwd, 2 bwd   STM 5 min soleus and TC standing glide 2x10  L2-3, 7 min warm up ellip   Dynamic warm up 2 laps each: SKTC, karaoke walk, HS kick, walking lunges  2 laps each gastroc stretch 3x30 sec   Standing post TC MWM 2x10    HEP: ankle AROM DF, inv/ever, toe flexion/extension     Charges: there ex 4    Total Timed Treatment: 55 min  Total Treatment Time: 55 min

## 2021-10-12 ENCOUNTER — OFFICE VISIT (OUTPATIENT)
Dept: PHYSICAL THERAPY | Age: 46
End: 2021-10-12
Attending: ORTHOPAEDIC SURGERY
Payer: COMMERCIAL

## 2021-10-12 PROCEDURE — 97110 THERAPEUTIC EXERCISES: CPT

## 2021-10-12 NOTE — PROGRESS NOTES
Dx:   s/p right achilles repair 6/3/21 by Dr. Lisa Boateng (Authorized # of Visits):  29 O          Authorizing Physician: Dr. Sammy Ferreira  Next MD visit: none scheduled  Fall Risk: standard         Precautions: n/a               Subjective: no i L1-2  L2 ellip fwd 4 min bwd 2 min  L2 ellip 5 min fwd, 2 bwd   STM 5 min soleus and TC standing glide 2x10  L2-3, 7 min warm up ellip 7 min warm up during subjective    Dynamic warm up 2 laps each: SKTC, karaoke walk, HS kick, walking lunges  2 laps each LE 2.5 cords    BFR R LE reverse lunge to DL heel rise x25, 3x15  peronal stretching 5x10 sec    Seated toe curls x30  Ankle AROM inversion/eversion 2x10  Toe AAROM, flex/ext  AP board gastroc stretch x10 bouts Standing TC glide 2x10  Standing DL heel rise

## 2021-10-20 ENCOUNTER — TELEPHONE (OUTPATIENT)
Dept: PHYSICAL THERAPY | Facility: HOSPITAL | Age: 46
End: 2021-10-20

## 2021-10-20 ENCOUNTER — TELEPHONE (OUTPATIENT)
Dept: ORTHOPEDICS CLINIC | Facility: CLINIC | Age: 46
End: 2021-10-20

## 2021-10-20 ENCOUNTER — OFFICE VISIT (OUTPATIENT)
Dept: PHYSICAL THERAPY | Age: 46
End: 2021-10-20
Attending: ORTHOPAEDIC SURGERY
Payer: COMMERCIAL

## 2021-10-20 DIAGNOSIS — S86.011A RUPTURE OF RIGHT ACHILLES TENDON, INITIAL ENCOUNTER: Primary | ICD-10-CM

## 2021-10-20 PROCEDURE — 97110 THERAPEUTIC EXERCISES: CPT

## 2021-10-20 NOTE — PROGRESS NOTES
Dx:   s/p right achilles repair 6/3/21 by Dr. Lizzie Harper (Authorized # of Visits):  29 O          Authorizing Physician: Dr. Natasha Marcus  Next MD visit: none scheduled  Fall Risk: standard         Precautions: n/a               Progress Summary with comprehensive HEP to maintain progress achieved in PT  · Pt to demo ability to do 5 SL heel rises without UE assist on R LE on going       Plan: Continue skilled Physical Therapy 1 x/week or a total of 8 visits over a 90 day period.  Treatment will inc subjective  ellip warm up 6 min L 3    Dynamic warm up 2 laps each: SKTC, karaoke walk, HS kick, walking lunges  2 laps each  2 laps each  Dyn warm up 2 laps each SKTC, lunge, HS stretch, karaoke  Dyn warm up 5 min  Dyn warm up 5 min  Standing post talar m cords    BFR R LE reverse lunge to DL heel rise x25, 3x15  peronal stretching 5x10 sec  BFR R LE 80% reverse lunge to heel rise x30, 3x15   BFR R LE 80% with 25 # x30, 3x15   Pt ed   Supine peroneal stretching x5 bouts   Seated toe curls x30  Ankle AROM in

## 2021-10-20 NOTE — TELEPHONE ENCOUNTER
Physical therapist called requesting additional physical therapy for patient. Please place Rx if acceptable.

## 2021-10-28 ENCOUNTER — OFFICE VISIT (OUTPATIENT)
Dept: PHYSICAL THERAPY | Age: 46
End: 2021-10-28
Attending: ORTHOPAEDIC SURGERY
Payer: COMMERCIAL

## 2021-10-28 ENCOUNTER — TELEPHONE (OUTPATIENT)
Dept: PHYSICAL THERAPY | Facility: HOSPITAL | Age: 46
End: 2021-10-28

## 2021-10-28 PROCEDURE — 97110 THERAPEUTIC EXERCISES: CPT

## 2021-10-28 PROCEDURE — 97140 MANUAL THERAPY 1/> REGIONS: CPT

## 2021-10-28 NOTE — PROGRESS NOTES
Dx:   s/p right achilles repair 6/3/21 by Dr. Rod Sanchez (Authorized # of Visits):  39 O          Authorizing Physician: Dr. Mayela Rosenberg  Next MD visit: none scheduled  Fall Risk: standard         Precautions: n/a                Subjective: He 10/20/2021  To:1/18/2022   SL reverse lunge to DL heel rise verbally added to HEP   HEP:   HS strengthening advanced to prone HS curl with dumbbell and SL bridge.    Quad strengthening advanced to SL squat off chair  Encouraged peloton ride up to 60 resista 80% SL shuttle heel rise 3 cords, x30, 3x15  BFR 24nch ASU x30, 3x15  BFR seated 25# x30, 3x15  BFR LE 80% squat to heel rise x30, 3x15     BFR R LE 2 ft ASU 10# ball, x30, 3x15   SL balance on bosu 3x30 sec   Peroneal stretch in supine 5x10 sec sed  Foam

## 2021-10-29 ENCOUNTER — TELEPHONE (OUTPATIENT)
Dept: ORTHOPEDICS CLINIC | Facility: CLINIC | Age: 46
End: 2021-10-29

## 2021-10-29 NOTE — TELEPHONE ENCOUNTER
LMOM for patient to schedule an appt w sincer to do a rt achilles wound check from 06.04.21 sx    Per Sincer, this can be either a video visit or an in person visit.

## 2021-11-01 NOTE — TELEPHONE ENCOUNTER
Patient called back.    Future Appointments   Date Time Provider Ganesh Desirae   11/4/2021  7:40 AM DONN Almeida [facility] Glasso [id number]   11/4/2021 10:45 [facility]   11/11/2021  9:30 AM SOLANGE PinedaT

## 2021-11-04 ENCOUNTER — APPOINTMENT (OUTPATIENT)
Dept: PHYSICAL THERAPY | Age: 46
End: 2021-11-04
Attending: ORTHOPAEDIC SURGERY
Payer: COMMERCIAL

## 2021-11-04 ENCOUNTER — TELEMEDICINE (OUTPATIENT)
Dept: ORTHOPEDICS CLINIC | Facility: CLINIC | Age: 46
End: 2021-11-04
Payer: COMMERCIAL

## 2021-11-04 DIAGNOSIS — S86.011D RUPTURE OF RIGHT ACHILLES TENDON, SUBSEQUENT ENCOUNTER: Primary | ICD-10-CM

## 2021-11-04 DIAGNOSIS — Z48.89 AFTERCARE FOLLOWING SURGERY: ICD-10-CM

## 2021-11-04 PROCEDURE — 99212 OFFICE O/P EST SF 10 MIN: CPT | Performed by: PHYSICIAN ASSISTANT

## 2021-11-04 NOTE — PROGRESS NOTES
[facility] - ORTHOPED[facility]  [address] [place] [address]     Name: Georgia Bello   MRN: [id number]  Date: 11/04/2021    REASON FOR VISIT: Fourth Post-Surgical Visit  Date of Surgery: 6/3/2020–ri neurovascular exam demonstrates normal perfusion, intact sensation to light touch. IMPRESSION: Felipa Julien. is a 42year old adult 5 months s/p right Achilles tendon repair, doing well without concerns.      PLAN:   We discussed scar creams to

## 2021-11-10 ENCOUNTER — PATIENT MESSAGE (OUTPATIENT)
Dept: ORTHOPEDICS CLINIC | Facility: CLINIC | Age: 46
End: 2021-11-10

## 2021-11-10 ENCOUNTER — TELEPHONE (OUTPATIENT)
Dept: PHYSICAL THERAPY | Facility: HOSPITAL | Age: 46
End: 2021-11-10

## 2021-11-10 RX ORDER — NAFTIFINE HYDROCHLORIDE 2 G/100G
1 GEL TOPICAL DAILY
Qty: 60 G | Refills: 0 | Status: SHIPPED | OUTPATIENT
Start: 2021-11-10

## 2021-11-11 ENCOUNTER — OFFICE VISIT (OUTPATIENT)
Dept: PHYSICAL THERAPY | Age: 46
End: 2021-11-11
Attending: ORTHOPAEDIC SURGERY
Payer: COMMERCIAL

## 2021-11-11 PROCEDURE — 97110 THERAPEUTIC EXERCISES: CPT

## 2021-11-11 NOTE — PROGRESS NOTES
Dx:   s/p right achilles repair 6/3/21 by Dr. Cha Giordano (Authorized # of Visits):  39 O          Authorizing Physician: Dr. Elizabeth Washington  Next MD visit: none scheduled  Fall Risk: standard         Precautions: n/a                Subjective: He 10/7/2021  tx 26  10/12/2021  tx 27/28 10/20/2021  tx 28  10/28/2021  tx 29  11/11/2021  tx 30    L2-3, 7 min warm up ellip 7 min warm up during subjective  ellip warm up 6 min L 3  ellip 5 min warm up   Foot assessment 5 min  ellip 6 min warm up Treatment: 55 min  Total Treatment Time: 55 min

## 2021-12-01 ENCOUNTER — OFFICE VISIT (OUTPATIENT)
Dept: PHYSICAL THERAPY | Age: 46
End: 2021-12-01
Attending: ORTHOPAEDIC SURGERY
Payer: COMMERCIAL

## 2021-12-01 PROCEDURE — 97110 THERAPEUTIC EXERCISES: CPT

## 2021-12-01 NOTE — PROGRESS NOTES
Dx:   s/p right achilles repair 6/3/21 by Dr. Rod Sanchez (Authorized # of Visits):  39 O          Authorizing Physician: Dr. Mayela Rosenberg  Next MD visit: none scheduled  Fall Risk: standard         Precautions: n/a                Subjective: He rides in saddle  Seated calf raise progression up to 40lbs   SL lateral squat, SL squat, walking lunges  SL AP board and SL balance out of shoe   SL RDL   DL heel rise with 25# wt in R hand       Certification From: 2/97/4520  To:10/19/2021        10/7/202 sec  BFR R LE 80% reverse lunge to heel rise x30, 3x15   BFR R LE 80% with 25 # x30, 3x15   Pt ed   Supine peroneal stretching x5 bouts STM peroneals and achilles 8 min  BFR R LE 80% SL heel rise on shuttle 60lbs x30,3 x15     SL balance with tramp toss 3

## 2021-12-13 ENCOUNTER — TELEPHONE (OUTPATIENT)
Dept: FAMILY MEDICINE CLINIC | Facility: CLINIC | Age: 46
End: 2021-12-13

## 2021-12-13 NOTE — TELEPHONE ENCOUNTER
Spoke to pt, states he had a conference in [place] and just got home this morning. States last night he started with body aches/chills and congestion, about 2 hours ago he started with a cough. Pt states he took a at home Covid test which is positive.  Lenka Vanegas

## 2021-12-13 NOTE — TELEPHONE ENCOUNTER
Patient states he did a home Covid test and it was positive, he wants to know if he should be re-tested, please advise.

## 2021-12-15 ENCOUNTER — HOSPITAL ENCOUNTER (EMERGENCY)
Facility: HOSPITAL | Age: 46
Discharge: HOME | End: 2021-12-15
Attending: EMERGENCY MEDICINE
Payer: COMMERCIAL

## 2021-12-15 ENCOUNTER — APPOINTMENT (OUTPATIENT)
Dept: GENERAL RADIOLOGY | Facility: HOSPITAL | Age: 46
End: 2021-12-15
Attending: EMERGENCY MEDICINE
Payer: COMMERCIAL

## 2021-12-15 ENCOUNTER — APPOINTMENT (OUTPATIENT)
Dept: PHYSICAL THERAPY | Age: 46
End: 2021-12-15
Attending: ORTHOPAEDIC SURGERY
Payer: COMMERCIAL

## 2021-12-15 ENCOUNTER — TELEPHONE (OUTPATIENT)
Dept: FAMILY MEDICINE CLINIC | Facility: CLINIC | Age: 46
End: 2021-12-15

## 2021-12-15 VITALS
TEMPERATURE: 97 F | WEIGHT: 250 LBS | BODY MASS INDEX: 31.08 KG/M2 | HEIGHT: 75 IN | RESPIRATION RATE: 14 BRPM | SYSTOLIC BLOOD PRESSURE: 139 MMHG | DIASTOLIC BLOOD PRESSURE: 77 MMHG | HEART RATE: 81 BPM | OXYGEN SATURATION: 95 %

## 2021-12-15 DIAGNOSIS — U07.1 COVID-19: Primary | ICD-10-CM

## 2021-12-15 PROCEDURE — 85025 COMPLETE CBC W/AUTO DIFF WBC: CPT | Performed by: EMERGENCY MEDICINE

## 2021-12-15 PROCEDURE — 96360 HYDRATION IV INFUSION INIT: CPT | Performed by: EMERGENCY MEDICINE

## 2021-12-15 PROCEDURE — 99284 EMERGENCY DEPT VISIT MOD MDM: CPT | Performed by: EMERGENCY MEDICINE

## 2021-12-15 PROCEDURE — 71045 X-RAY EXAM CHEST 1 VIEW: CPT | Performed by: EMERGENCY MEDICINE

## 2021-12-15 PROCEDURE — 80053 COMPREHEN METABOLIC PANEL: CPT | Performed by: EMERGENCY MEDICINE

## 2021-12-15 PROCEDURE — 96361 HYDRATE IV INFUSION ADD-ON: CPT | Performed by: EMERGENCY MEDICINE

## 2021-12-15 RX ORDER — ACETAMINOPHEN 500 MG
1000 TABLET ORAL ONCE
Status: COMPLETED | OUTPATIENT
Start: 2021-12-15 | End: 2021-12-15

## 2021-12-15 RX ORDER — SODIUM CHLORIDE 9 MG/ML
INJECTION, SOLUTION INTRAVENOUS CONTINUOUS
Status: DISCONTINUED | OUTPATIENT
Start: 2021-12-15 | End: 2021-12-16

## 2021-12-15 NOTE — ED INITIAL ASSESSMENT (HPI)
Pt to ed for c/o lightheadedness and sweats with headache and fever since Monday. Pt had a positive covid test on Monday. Denies SOB and chest pain.

## 2021-12-16 ENCOUNTER — TELEPHONE (OUTPATIENT)
Dept: FAMILY MEDICINE CLINIC | Facility: CLINIC | Age: 46
End: 2021-12-16

## 2021-12-16 NOTE — ED PROVIDER NOTES
Patient Seen in: [facility] Emergency Department      History   Patient presents with:  Dizziness  Covid    Stated Complaint: covid +     Subjective:   HPI    Patient is a 37-year-old male with 2 days of intermittent fevers and chills, some cough. are equal, round, and reactive to light. Cardiovascular:      Rate and Rhythm: Normal rate and regular rhythm. Heart sounds: Normal heart sounds. Pulmonary:      Effort: Pulmonary effort is normal.      Breath sounds: Normal breath sounds.    Abdom ------                     CBC W/ DIFFERENTIAL[[id number]]          Abnormal            Final result                 Please view results for these tests on the individual orders.           XR CHEST AP PORTABLE  (CPT=71045)    Result Date: 12/15/2021 They have had mild symptoms for 3 days. They do not require oxygen or hospitalization and have the following risks factors: BMI over 25.     The patient/caregiver has been given the “Fact Sheet for Patients, Parents and Caregivers”, informed of alternatives

## 2021-12-16 NOTE — ED QUICK NOTES
Patient was provided with pulse ox and incentive spirometer , and instructed on use. Patient verbalized understanding.

## 2021-12-16 NOTE — TELEPHONE ENCOUNTER
Spoke to patient who states that he is feeling better today. His fever has broken, and his O2 has been 95+.

## 2021-12-20 ENCOUNTER — APPOINTMENT (OUTPATIENT)
Dept: PHYSICAL THERAPY | Age: 46
End: 2021-12-20
Attending: ORTHOPAEDIC SURGERY
Payer: COMMERCIAL

## 2021-12-27 ENCOUNTER — OFFICE VISIT (OUTPATIENT)
Dept: PHYSICAL THERAPY | Age: 46
End: 2021-12-27
Attending: ORTHOPAEDIC SURGERY
Payer: COMMERCIAL

## 2021-12-27 PROCEDURE — 97110 THERAPEUTIC EXERCISES: CPT

## 2021-12-27 PROCEDURE — 97140 MANUAL THERAPY 1/> REGIONS: CPT

## 2021-12-28 NOTE — PROGRESS NOTES
Dx:   s/p right achilles repair 6/3/21 by Dr. William Beaulieu (Authorized # of Visits):  39 O          Authorizing Physician: Dr. Alexis Lee  Next MD visit: none scheduled  Fall Risk: standard         Precautions: n/a                Subjective: hav progression up to 40lbs   SL lateral squat, SL squat, walking lunges  SL AP board and SL balance out of shoe   SL RDL   DL heel rise with 25# wt in R hand       Certification From: 5/99/9415  To:10/19/2021        10/7/2021  tx 26  10/12/2021  tx 27/28 10/2 in supine 5x10 sec sed  Foam roll to gastroc 5 min  BFR R LE 80% SL shuttle heel rise 3 cords x30, 3x15   SL shuttle jump R LE 2.5 cords    BFR R LE reverse lunge to DL heel rise x25, 3x15  peronal stretching 5x10 sec  BFR R LE 80% reverse lunge to heel ri

## 2021-12-31 ENCOUNTER — PATIENT MESSAGE (OUTPATIENT)
Dept: FAMILY MEDICINE CLINIC | Facility: CLINIC | Age: 46
End: 2021-12-31

## 2022-01-03 NOTE — TELEPHONE ENCOUNTER
From: Melody Sparks. To: Angelique Hinton DO  Sent: 12/31/2021 9:53 AM CST  Subject: Covid follow-up    Good Morning,    I was Covid positive on the 13th, went to the ER on 12/15/21 and received chest x-ray, antibodies and fluids.  I have been fever fr

## 2022-01-04 ENCOUNTER — OFFICE VISIT (OUTPATIENT)
Dept: PHYSICAL THERAPY | Age: 47
End: 2022-01-04
Attending: ORTHOPAEDIC SURGERY
Payer: COMMERCIAL

## 2022-01-04 PROCEDURE — 97110 THERAPEUTIC EXERCISES: CPT

## 2022-01-04 NOTE — PROGRESS NOTES
Dx:   s/p right achilles repair 6/3/21 by Dr. Kamilah Staples (Authorized # of Visits):  39 O          Authorizing Physician: Dr. Shlomo Teran  Next MD visit: none scheduled  Fall Risk: standard         Precautions: n/a                Progress Summar household ambulation  achieved  · Pt will be independent and compliant with comprehensive HEP to maintain progress achieved in PT  · Pt to demo ability to do 5 SL heel rises without UE assist on R LE on going       Plan: cont per POC with emphasis on SL st post talar mob, 2x10  MWM talar mob, 2x10   Dyn walking warm up 5 min  Dynamic walking warm SKTC, HS kick, walking lunges 2 laps each  STM/MT achilles and evertors 5 min  Walking warm up/dynamic warm up 2 laps each  2 laps    SL AP w/ and w/o hands x15  Wa iso walk fwd/bwd 6 laps in parallel bars   BFR R LE 80% SL heel rise 3 cords x30, 3x15  BFR R LE 4 inch box run up x30, 3x15  iso walk fwd/bwd 6 laps   isometric gastroc contraction x10 bouts  MT 8 min   STM/DTM gastroc/soleus and PF  Return to impact iraj

## 2022-01-06 ENCOUNTER — APPOINTMENT (OUTPATIENT)
Dept: PHYSICAL THERAPY | Age: 47
End: 2022-01-06
Attending: ORTHOPAEDIC SURGERY
Payer: COMMERCIAL

## 2022-01-07 ENCOUNTER — OFFICE VISIT (OUTPATIENT)
Dept: ORTHOPEDICS CLINIC | Facility: CLINIC | Age: 47
End: 2022-01-07
Payer: COMMERCIAL

## 2022-01-07 DIAGNOSIS — S86.011D RUPTURE OF RIGHT ACHILLES TENDON, SUBSEQUENT ENCOUNTER: Primary | ICD-10-CM

## 2022-01-07 PROCEDURE — 99213 OFFICE O/P EST LOW 20 MIN: CPT | Performed by: ORTHOPAEDIC SURGERY

## 2022-01-07 NOTE — PROGRESS NOTES
[facility]  [address]     Name: Yamil Simeon   MRN: [id number]  Date: 1/7/21    REASON FOR VISIT:  Post-Surgical Visit  Date of Surgery: 6/3/2020–right Evans tendon repair, doing well without concerns. PLAN:   Continue with rehab efforts, follow up in 12 weeks. No further restriction, Recommendation for Alter G zero gravity treadmill.

## 2022-01-12 ENCOUNTER — TELEPHONE (OUTPATIENT)
Dept: PHYSICAL THERAPY | Facility: HOSPITAL | Age: 47
End: 2022-01-12

## 2022-01-12 ENCOUNTER — TELEPHONE (OUTPATIENT)
Dept: ORTHOPEDICS CLINIC | Facility: CLINIC | Age: 47
End: 2022-01-12

## 2022-01-12 ENCOUNTER — OFFICE VISIT (OUTPATIENT)
Dept: PHYSICAL THERAPY | Age: 47
End: 2022-01-12
Attending: ORTHOPAEDIC SURGERY
Payer: COMMERCIAL

## 2022-01-12 DIAGNOSIS — S86.011D RUPTURE OF RIGHT ACHILLES TENDON, SUBSEQUENT ENCOUNTER: Primary | ICD-10-CM

## 2022-01-12 PROCEDURE — 97110 THERAPEUTIC EXERCISES: CPT

## 2022-01-12 NOTE — PROGRESS NOTES
Dx:   s/p right achilles repair 6/3/21 by Dr. Elayne Powell (Authorized # of Visits):  39 O          Authorizing Physician: Dr. Tomás Churchill MD visit: none scheduled  Fall Risk: standard         Precautions: n/a                  Subjective: f warm up   Foot assessment 5 min  ellip 6 min warm up  6 min warm up on ellip  ellip 5 min  ellip 6 min warm up   Standing gastroc stretch 3x30 sec  6 min warm up   3x30 sec end of session      Standing post talar mob, 2x10  MWM talar mob, 2x10   Dyn walkin laps    TM walk/run   3.0pmh/5.5mph   3 min, 1 min jog  3 bouts each, 15 min    --- Pt ed   --- Pt ed and HEP instruct 5 min  Pt ed and HEP instruct  SL AP board PF 3x15   Pt ed and HEP review options for return to gym with LE strengthening        Charges:

## 2022-01-12 NOTE — TELEPHONE ENCOUNTER
[facility] would like to request a referral for patient's RT Ankle Achilles Tendon. Thanks.     Tess Fernandez can be reached at [phone]

## 2022-01-14 ENCOUNTER — OFFICE VISIT (OUTPATIENT)
Dept: ORTHOPEDICS CLINIC | Facility: CLINIC | Age: 47
End: 2022-01-14
Payer: COMMERCIAL

## 2022-01-14 DIAGNOSIS — L60.0 ONYCHOCRYPTOSIS: ICD-10-CM

## 2022-01-14 DIAGNOSIS — B35.1 ONYCHOMYCOSIS: Primary | ICD-10-CM

## 2022-01-14 DIAGNOSIS — M79.674 PAIN OF TOE OF RIGHT FOOT: ICD-10-CM

## 2022-01-14 PROCEDURE — 99213 OFFICE O/P EST LOW 20 MIN: CPT | Performed by: PODIATRIST

## 2022-01-14 PROCEDURE — 10060 I&D ABSCESS SIMPLE/SINGLE: CPT | Performed by: PODIATRIST

## 2022-01-14 NOTE — PROGRESS NOTES
[facility] Progress Note    Subjective:     Josefina Mireles is here for follow-up of his fungal nail and also with the development of an ingrown nail on the right great toe. He has had it for several months and has been soaking without any relief.   He has

## 2022-01-17 ENCOUNTER — TELEPHONE (OUTPATIENT)
Dept: PHYSICAL THERAPY | Facility: HOSPITAL | Age: 47
End: 2022-01-17

## 2022-01-17 ENCOUNTER — LAB ENCOUNTER (OUTPATIENT)
Dept: LAB | Age: 47
End: 2022-01-17
Attending: PODIATRIST
Payer: COMMERCIAL

## 2022-01-17 DIAGNOSIS — L60.0 ONYCHOCRYPTOSIS: ICD-10-CM

## 2022-01-17 DIAGNOSIS — B35.1 ONYCHOMYCOSIS: ICD-10-CM

## 2022-01-17 LAB
ALBUMIN SERPL-MCNC: 4.2 G/DL (ref 3.4–5)
ALP LIVER SERPL-CCNC: 102 U/L
ALT SERPL-CCNC: 45 U/L
AST SERPL-CCNC: 22 U/L (ref 15–37)
BILIRUB DIRECT SERPL-MCNC: 0.2 MG/DL (ref 0–0.2)
BILIRUB SERPL-MCNC: 0.8 MG/DL (ref 0.1–2)
PROT SERPL-MCNC: 7.5 G/DL (ref 6.4–8.2)

## 2022-01-17 PROCEDURE — 80076 HEPATIC FUNCTION PANEL: CPT

## 2022-01-17 PROCEDURE — 36415 COLL VENOUS BLD VENIPUNCTURE: CPT

## 2022-01-20 ENCOUNTER — OFFICE VISIT (OUTPATIENT)
Dept: PHYSICAL THERAPY | Age: 47
End: 2022-01-20
Attending: ORTHOPAEDIC SURGERY
Payer: COMMERCIAL

## 2022-01-20 PROCEDURE — 97110 THERAPEUTIC EXERCISES: CPT

## 2022-01-20 NOTE — PROGRESS NOTES
Dx:   s/p right achilles repair 6/3/21 by Dr. Marian Machado (Authorized # of Visits):  39 O          Authorizing Physician: Dr. Robert Dewitt  Next MD visit: none scheduled  Fall Risk: standard         Precautions: n/a                  Subjective: f 12/27/2021  tx 32/36  1/4/2022  tx 33/36  1/12/2022  tx 34/36 1/20/2022  tx 35/36    ellip warm up 6 min L 3  ellip 5 min warm up   Foot assessment 5 min  ellip 6 min warm up  6 min warm up on ellip  ellip 5 min  ellip 6 min warm up   Standing gastroc stre 80% SL heel rise 3 cords x30, 3x15  BFR R LE 4 inch box run up x30, 3x15  iso walk fwd/bwd 6 laps   isometric gastroc contraction x10 bouts  MT 8 min   STM/DTM gastroc/soleus and PF  Return to impact training:   High knees 6 laps   Butt kicks 6 laps   Fwd/

## 2022-02-03 ENCOUNTER — OFFICE VISIT (OUTPATIENT)
Dept: PHYSICAL THERAPY | Age: 47
End: 2022-02-03
Attending: ORTHOPAEDIC SURGERY
Payer: COMMERCIAL

## 2022-02-03 PROCEDURE — 97110 THERAPEUTIC EXERCISES: CPT

## 2022-02-23 RX ORDER — TERBINAFINE HYDROCHLORIDE 250 MG/1
250 TABLET ORAL DAILY
Qty: 30 TABLET | Refills: 2 | Status: SHIPPED | OUTPATIENT
Start: 2022-02-23 | End: 2022-05-18

## 2022-02-24 ENCOUNTER — OFFICE VISIT (OUTPATIENT)
Dept: PHYSICAL THERAPY | Age: 47
End: 2022-02-24
Attending: ORTHOPAEDIC SURGERY
Payer: COMMERCIAL

## 2022-02-24 PROCEDURE — 97110 THERAPEUTIC EXERCISES: CPT

## 2022-03-17 ENCOUNTER — OFFICE VISIT (OUTPATIENT)
Dept: PHYSICAL THERAPY | Age: 47
End: 2022-03-17
Attending: ORTHOPAEDIC SURGERY
Payer: COMMERCIAL

## 2022-03-17 PROCEDURE — 97110 THERAPEUTIC EXERCISES: CPT

## 2022-04-08 ENCOUNTER — OFFICE VISIT (OUTPATIENT)
Dept: ORTHOPEDICS CLINIC | Facility: CLINIC | Age: 47
End: 2022-04-08
Payer: COMMERCIAL

## 2022-04-08 DIAGNOSIS — S86.011D RUPTURE OF RIGHT ACHILLES TENDON, SUBSEQUENT ENCOUNTER: Primary | ICD-10-CM

## 2022-04-08 PROCEDURE — 99214 OFFICE O/P EST MOD 30 MIN: CPT | Performed by: ORTHOPAEDIC SURGERY

## 2023-04-12 ENCOUNTER — OFFICE VISIT (OUTPATIENT)
Dept: FAMILY MEDICINE CLINIC | Facility: CLINIC | Age: 48
End: 2023-04-12
Payer: COMMERCIAL

## 2023-04-12 VITALS
BODY MASS INDEX: 32.95 KG/M2 | OXYGEN SATURATION: 95 % | RESPIRATION RATE: 21 BRPM | DIASTOLIC BLOOD PRESSURE: 90 MMHG | TEMPERATURE: 98 F | WEIGHT: 265 LBS | SYSTOLIC BLOOD PRESSURE: 140 MMHG | HEIGHT: 75 IN | HEART RATE: 82 BPM

## 2023-04-12 DIAGNOSIS — Z00.00 LABORATORY EXAMINATION ORDERED AS PART OF A ROUTINE GENERAL MEDICAL EXAMINATION: ICD-10-CM

## 2023-04-12 DIAGNOSIS — Z00.00 ANNUAL PHYSICAL EXAM: Primary | ICD-10-CM

## 2023-04-12 DIAGNOSIS — Z23 NEED FOR TDAP VACCINATION: ICD-10-CM

## 2023-04-12 DIAGNOSIS — Z12.11 COLON CANCER SCREENING: ICD-10-CM

## 2023-04-12 DIAGNOSIS — G47.9 SLEEPING DIFFICULTY: ICD-10-CM

## 2023-04-12 DIAGNOSIS — R03.0 ELEVATED BLOOD PRESSURE READING: ICD-10-CM

## 2023-04-12 LAB
ALBUMIN SERPL-MCNC: 4.1 G/DL (ref 3.4–5)
ALBUMIN/GLOB SERPL: 1.3 {RATIO} (ref 1–2)
ALP LIVER SERPL-CCNC: 77 U/L
ALT SERPL-CCNC: 69 U/L
ANION GAP SERPL CALC-SCNC: 2 MMOL/L (ref 0–18)
AST SERPL-CCNC: 36 U/L (ref 15–37)
BASOPHILS # BLD AUTO: 0.01 X10(3) UL (ref 0–0.2)
BASOPHILS NFR BLD AUTO: 0.2 %
BILIRUB SERPL-MCNC: 0.5 MG/DL (ref 0.1–2)
BUN BLD-MCNC: 18 MG/DL (ref 7–18)
CALCIUM BLD-MCNC: 8.8 MG/DL (ref 8.5–10.1)
CHLORIDE SERPL-SCNC: 108 MMOL/L (ref 98–112)
CHOLEST SERPL-MCNC: 209 MG/DL (ref ?–200)
CO2 SERPL-SCNC: 25 MMOL/L (ref 21–32)
CREAT BLD-MCNC: 1.16 MG/DL
EOSINOPHIL # BLD AUTO: 0.12 X10(3) UL (ref 0–0.7)
EOSINOPHIL NFR BLD AUTO: 2.1 %
ERYTHROCYTE [DISTWIDTH] IN BLOOD BY AUTOMATED COUNT: 12.5 %
EST. AVERAGE GLUCOSE BLD GHB EST-MCNC: 108 MG/DL (ref 68–126)
FASTING PATIENT LIPID ANSWER: YES
FASTING STATUS PATIENT QL REPORTED: YES
GFR SERPLBLD BASED ON 1.73 SQ M-ARVRAT: 79 ML/MIN/1.73M2 (ref 60–?)
GLOBULIN PLAS-MCNC: 3.2 G/DL (ref 2.8–4.4)
GLUCOSE BLD-MCNC: 104 MG/DL (ref 70–99)
HBA1C MFR BLD: 5.4 % (ref ?–5.7)
HCT VFR BLD AUTO: 47.3 %
HDLC SERPL-MCNC: 42 MG/DL (ref 40–59)
HGB BLD-MCNC: 16.4 G/DL
IMM GRANULOCYTES # BLD AUTO: 0.02 X10(3) UL (ref 0–1)
IMM GRANULOCYTES NFR BLD: 0.4 %
LDLC SERPL CALC-MCNC: 130 MG/DL (ref ?–100)
LYMPHOCYTES # BLD AUTO: 1.16 X10(3) UL (ref 1–4)
LYMPHOCYTES NFR BLD AUTO: 20.7 %
MCH RBC QN AUTO: 29.9 PG (ref 26–34)
MCHC RBC AUTO-ENTMCNC: 34.7 G/DL (ref 31–37)
MCV RBC AUTO: 86.2 FL
MONOCYTES # BLD AUTO: 0.41 X10(3) UL (ref 0.1–1)
MONOCYTES NFR BLD AUTO: 7.3 %
NEUTROPHILS # BLD AUTO: 3.88 X10 (3) UL (ref 1.5–7.7)
NEUTROPHILS # BLD AUTO: 3.88 X10(3) UL (ref 1.5–7.7)
NEUTROPHILS NFR BLD AUTO: 69.3 %
NONHDLC SERPL-MCNC: 167 MG/DL (ref ?–130)
OSMOLALITY SERPL CALC.SUM OF ELEC: 282 MOSM/KG (ref 275–295)
PLATELET # BLD AUTO: 247 10(3)UL (ref 150–450)
POTASSIUM SERPL-SCNC: 3.9 MMOL/L (ref 3.5–5.1)
PROT SERPL-MCNC: 7.3 G/DL (ref 6.4–8.2)
RBC # BLD AUTO: 5.49 X10(6)UL
SODIUM SERPL-SCNC: 135 MMOL/L (ref 136–145)
TRIGL SERPL-MCNC: 206 MG/DL (ref 30–149)
TSI SER-ACNC: 1.54 MIU/ML (ref 0.36–3.74)
VLDLC SERPL CALC-MCNC: 38 MG/DL (ref 0–30)
WBC # BLD AUTO: 5.6 X10(3) UL (ref 4–11)

## 2023-04-12 PROCEDURE — 3008F BODY MASS INDEX DOCD: CPT | Performed by: STUDENT IN AN ORGANIZED HEALTH CARE EDUCATION/TRAINING PROGRAM

## 2023-04-12 PROCEDURE — 99396 PREV VISIT EST AGE 40-64: CPT | Performed by: STUDENT IN AN ORGANIZED HEALTH CARE EDUCATION/TRAINING PROGRAM

## 2023-04-12 PROCEDURE — 90715 TDAP VACCINE 7 YRS/> IM: CPT | Performed by: STUDENT IN AN ORGANIZED HEALTH CARE EDUCATION/TRAINING PROGRAM

## 2023-04-12 PROCEDURE — 3080F DIAST BP >= 90 MM HG: CPT | Performed by: STUDENT IN AN ORGANIZED HEALTH CARE EDUCATION/TRAINING PROGRAM

## 2023-04-12 PROCEDURE — 80053 COMPREHEN METABOLIC PANEL: CPT | Performed by: STUDENT IN AN ORGANIZED HEALTH CARE EDUCATION/TRAINING PROGRAM

## 2023-04-12 PROCEDURE — 3077F SYST BP >= 140 MM HG: CPT | Performed by: STUDENT IN AN ORGANIZED HEALTH CARE EDUCATION/TRAINING PROGRAM

## 2023-04-12 PROCEDURE — 83036 HEMOGLOBIN GLYCOSYLATED A1C: CPT | Performed by: STUDENT IN AN ORGANIZED HEALTH CARE EDUCATION/TRAINING PROGRAM

## 2023-04-12 PROCEDURE — 80061 LIPID PANEL: CPT | Performed by: STUDENT IN AN ORGANIZED HEALTH CARE EDUCATION/TRAINING PROGRAM

## 2023-04-12 PROCEDURE — 84443 ASSAY THYROID STIM HORMONE: CPT | Performed by: STUDENT IN AN ORGANIZED HEALTH CARE EDUCATION/TRAINING PROGRAM

## 2023-04-12 PROCEDURE — 90471 IMMUNIZATION ADMIN: CPT | Performed by: STUDENT IN AN ORGANIZED HEALTH CARE EDUCATION/TRAINING PROGRAM

## 2023-04-12 PROCEDURE — 85025 COMPLETE CBC W/AUTO DIFF WBC: CPT | Performed by: STUDENT IN AN ORGANIZED HEALTH CARE EDUCATION/TRAINING PROGRAM

## 2023-04-12 RX ORDER — FLUTICASONE FUROATE 27.5 UG/1
SPRAY, METERED NASAL
COMMUNITY
Start: 2022-09-01

## 2023-04-12 NOTE — PATIENT INSTRUCTIONS
Blood Pressure Cuff Brand: OMRON 3-Series Arm Cuff  Take your blood pressure once in the morning and once in the evening for at least 7 days. If your average blood pressure is close to 120/80 then this is good. If however your average blood pressure is close to 140/90 or above you may require blood pressure medications.

## 2023-04-19 ENCOUNTER — PATIENT MESSAGE (OUTPATIENT)
Dept: FAMILY MEDICINE CLINIC | Facility: CLINIC | Age: 48
End: 2023-04-19

## 2023-04-19 DIAGNOSIS — L60.0 INGROWN TOENAIL: Primary | ICD-10-CM

## 2023-05-10 NOTE — TELEPHONE ENCOUNTER
From: Jordana Kenyon. To: Ruba Paz MD  Sent: 4/19/2023 11:15 AM CDT  Subject: BP Tracking    Hi Dr Jason Magana,    I have been tracking my BP using an Omron, resting for 5 minutes prior to starting, and keeping my back supported and feet on the ground. The average has been 130/88, HR 64 BPM.     I can PDF and send the individual measures as well. Let me know your thoughts/inclination for starting meds.     Kind regards,  Mattie Escobar

## 2023-06-20 ENCOUNTER — OFFICE VISIT (OUTPATIENT)
Dept: PODIATRY CLINIC | Facility: CLINIC | Age: 48
End: 2023-06-20

## 2023-06-20 VITALS — DIASTOLIC BLOOD PRESSURE: 90 MMHG | SYSTOLIC BLOOD PRESSURE: 130 MMHG

## 2023-06-20 DIAGNOSIS — M79.674 PAIN OF RIGHT GREAT TOE: ICD-10-CM

## 2023-06-20 DIAGNOSIS — L03.031 PARONYCHIA OF GREAT TOE OF RIGHT FOOT: ICD-10-CM

## 2023-06-20 DIAGNOSIS — L60.0 INGROWN NAIL: Primary | ICD-10-CM

## 2023-06-20 NOTE — PROGRESS NOTES
Per Verbal orders from Dr. Valentin Mu draw up 2.5 ml of 0.5% Marcaine and 2.5 ml of 1% lidocaine for injection to right hallux.   Casi Henry MA

## 2023-06-23 PROBLEM — Z12.11 SPECIAL SCREENING FOR MALIGNANT NEOPLASM OF COLON: Status: ACTIVE | Noted: 2023-06-23

## 2023-06-23 PROBLEM — D12.0 BENIGN NEOPLASM OF CECUM: Status: ACTIVE | Noted: 2023-06-23

## 2023-06-23 PROBLEM — K63.5 COLON POLYP: Status: ACTIVE | Noted: 2023-06-23

## 2023-06-23 PROCEDURE — 88305 TISSUE EXAM BY PATHOLOGIST: CPT | Performed by: INTERNAL MEDICINE

## 2023-06-24 NOTE — PATIENT INSTRUCTIONS
Perform soaking and aftercare as directed. Follow-up if any pain or other concerns arise moving forward.

## 2023-10-12 ENCOUNTER — MED REC SCAN ONLY (OUTPATIENT)
Dept: FAMILY MEDICINE CLINIC | Facility: CLINIC | Age: 48
End: 2023-10-12

## 2023-12-08 ENCOUNTER — PATIENT MESSAGE (OUTPATIENT)
Dept: FAMILY MEDICINE CLINIC | Facility: CLINIC | Age: 48
End: 2023-12-08

## 2023-12-11 ENCOUNTER — HOSPITAL ENCOUNTER (OUTPATIENT)
Dept: GENERAL RADIOLOGY | Age: 48
Discharge: HOME | End: 2023-12-11
Attending: STUDENT IN AN ORGANIZED HEALTH CARE EDUCATION/TRAINING PROGRAM
Payer: COMMERCIAL

## 2023-12-11 ENCOUNTER — OFFICE VISIT (OUTPATIENT)
Dept: FAMILY MEDICINE CLINIC | Facility: CLINIC | Age: 48
End: 2023-12-11
Payer: COMMERCIAL

## 2023-12-11 VITALS
HEART RATE: 63 BPM | RESPIRATION RATE: 18 BRPM | WEIGHT: 260 LBS | SYSTOLIC BLOOD PRESSURE: 140 MMHG | DIASTOLIC BLOOD PRESSURE: 90 MMHG | BODY MASS INDEX: 32.33 KG/M2 | OXYGEN SATURATION: 98 % | HEIGHT: 75 IN

## 2023-12-11 DIAGNOSIS — M79.671 RIGHT FOOT PAIN: ICD-10-CM

## 2023-12-11 DIAGNOSIS — M79.671 RIGHT FOOT PAIN: Primary | ICD-10-CM

## 2023-12-11 PROCEDURE — 99213 OFFICE O/P EST LOW 20 MIN: CPT | Performed by: STUDENT IN AN ORGANIZED HEALTH CARE EDUCATION/TRAINING PROGRAM

## 2023-12-11 PROCEDURE — 3077F SYST BP >= 140 MM HG: CPT | Performed by: STUDENT IN AN ORGANIZED HEALTH CARE EDUCATION/TRAINING PROGRAM

## 2023-12-11 PROCEDURE — 73630 X-RAY EXAM OF FOOT: CPT | Performed by: STUDENT IN AN ORGANIZED HEALTH CARE EDUCATION/TRAINING PROGRAM

## 2023-12-11 PROCEDURE — 3080F DIAST BP >= 90 MM HG: CPT | Performed by: STUDENT IN AN ORGANIZED HEALTH CARE EDUCATION/TRAINING PROGRAM

## 2023-12-11 PROCEDURE — 3008F BODY MASS INDEX DOCD: CPT | Performed by: STUDENT IN AN ORGANIZED HEALTH CARE EDUCATION/TRAINING PROGRAM

## 2023-12-11 NOTE — TELEPHONE ENCOUNTER
From: Paige Martines. To: Lizzy Robles  Sent: 12/8/2023 4:50 PM CST  Subject: Breanna Kang,    I have pain in my right foot, probably a 4 on 1-10 scale. But it is fairly consistent. It is on the lateral edge, mid foot, feels like there is a bruise, but no discoloration, and I don't have a specific event that occured. Wondering what my best course of action is? Should I schedule an appointment with you? With the orthopedic that completed my Achilles repair? Someone else?     Kind regards,  Marisol Schmitz

## 2023-12-11 NOTE — TELEPHONE ENCOUNTER
Last OV  4/12/23  PE            Next OV  none    New  painful bump on side of foot  Denies trauma  Hx of achilles tendon repair on that side. Pt accepts appt :     Future Appointments   Date Time Provider Ganesh Merrill   12/11/2023  2:00 PM Navin Nixon MD [facility]

## 2024-02-09 ENCOUNTER — OFFICE VISIT (OUTPATIENT)
Dept: PODIATRY CLINIC | Facility: CLINIC | Age: 49
End: 2024-02-09
Payer: COMMERCIAL

## 2024-02-09 DIAGNOSIS — L60.0 INGROWN NAIL: Primary | ICD-10-CM

## 2024-02-09 DIAGNOSIS — L03.031 PARONYCHIA OF GREAT TOE OF RIGHT FOOT: ICD-10-CM

## 2024-02-09 PROCEDURE — 11750 EXCISION NAIL&NAIL MATRIX: CPT | Performed by: STUDENT IN AN ORGANIZED HEALTH CARE EDUCATION/TRAINING PROGRAM

## 2024-02-09 RX ORDER — CEPHALEXIN 500 MG/1
500 CAPSULE ORAL 2 TIMES DAILY
Qty: 14 CAPSULE | Refills: 0 | Status: SHIPPED | OUTPATIENT
Start: 2024-02-09

## 2024-02-09 NOTE — PROGRESS NOTES
[facility]  Progress Note    Roger Batista Jr. is a 48 year old male.   Chief Complaint   Patient presents with    Toe Pain     Right great ingrown nail. Pain when touched.         HPI:     Patient is a pleasant 48-year-old male who presents to clinic for evaluation of ingrown nail to the medial border of his right hallux.  He had nail avulsion to site in the past but it is started to return.  He has pain to site when touched.  He is interested in permanent nail removal procedure to medial border of his right great toe at this time.  No other complaints are mentioned.     Allergies: Patient has no known allergies.   Current Outpatient Medications   Medication Sig Dispense Refill    cephalexin (KEFLEX) 500 MG Oral Cap Take 1 capsule (500 mg total) by mouth 2 (two) times daily. 14 capsule 0    Fluticasone Furoate (FLONASE SENSIMIST) 27.5 MCG/SPRAY Nasal Suspension       loratadine 10 MG Oral Tab Take 1 tablet (10 mg total) by mouth daily.        Past Medical History:   Diagnosis Date    Achilles rupture, right     Change in hair     Decorative tattoo     Lipid screening 01/13/2012    Sleep apnea     Sleep disturbance     Stress       Past Surgical History:   Procedure Laterality Date    ADENOIDECTOMY      APPENDECTOMY  1993    OTHER SURGICAL HISTORY      Ear surgery Eustachian tube x3    OTHER SURGICAL HISTORY  06/03/2021    achilles repair    TONSILLECTOMY  1985    VASECTOMY  2009      Family History   Problem Relation Age of Onset    Obesity Father     Obesity Mother     Hypertension Mother     Arthritis Mother         RA    Other (Other) Mother         seizures & RA    Diabetes Maternal Grandmother         DM    Other (Other) Paternal Grandmother         PARKINSONS    Heart Attack Paternal Grandfather         MI    Arthritis Sister         RA      Social History     Socioeconomic History    Marital status:    Occupational History     Employer: [employer]   Tobacco Use    Smoking status:  Never     Passive exposure: Never    Smokeless tobacco: Never   Vaping Use    Vaping Use: Never used   Substance and Sexual Activity    Alcohol use: Yes     Comment: 1-2 x per week    Drug use: No    Sexual activity: Yes     Partners: Female   Other Topics Concern    Caffeine Concern Yes     Comment: Daily; 3-4 cups     Exercise Yes     Comment: Weekly; 4-5 days     Seat Belt Yes           REVIEW OF SYSTEMS:     Today reviewed systems as documented below  GENERAL HEALTH: feels well otherwise  SKIN: denies any unusual skin lesions or rashes  RESPIRATORY: denies shortness of breath with exertion  CARDIOVASCULAR: denies chest pain on exertion  GI: denies abdominal pain and denies heartburn  NEURO: denies headaches  MUSCULO: denies arthritis, back pain      EXAM:   There were no vitals taken for this visit.  GENERAL: well developed, well nourished, in no apparent distress  EXTREMITIES:   1. Integument: Normal skin temperature and turgor.  Medial border of right hallux nail is incurvated with minor erythema and irritation present.  Well-healed cicatrix to right posterior Achilles.  2. Vascular: Dorsalis pedis two out of four bilateral and posterior tibial pulses two out of   four bilateral, capillary refill normal.   3. Musculoskeletal: All muscle groups are graded 5 out of 5 in the foot and ankle.  Pain noted to site of ingrown nail.  No strength deficits.   4. Neurological: Normal sharp dull sensation; reflexes normal.      ASSESSMENT AND PLAN:   Diagnoses and all orders for this visit:    Ingrown nail  -     cephalexin (KEFLEX) 500 MG Oral Cap; Take 1 capsule (500 mg total) by mouth 2 (two) times daily.    Paronychia of great toe of right foot          Plan:     -Patient examined, chart history reviewed.  -Discussed etiology of patient's condition and various treatment options.  -Discussed matrixectomy procedure to medial border of right great toe at this time given chronically ingrown nail to site.  Procedure was  discussed including benefits, risks, and recovery period.  Patient agreeable and written consent was obtained.      Procedure as follows:  After prepping site with alcohol, administered local infiltrative block to right hallux consisting of 5 cc of 1:1 mixture of 0.5% Marcaine plain and 1% lidocaine plain.  After sufficient anesthesia was achieved, the digit was prepped with Betadine.  Next, using a hemostat, the medial border of the right hallux nail was freed.  An English anvil was then used to cut the incurvated portion of the nail down to the nail matrix.  A hemostat was once again used to remove the incurvated portion of the nail in its entirety.  Next, a digital tourniquet was applied to the site.  Surrounding skin was protected with bacitracin.  The dermal curette was used to curette any nail matrix to the border.  Next, 3 x 89% phenol applications were applied per standard technique.  The site was then copiously irrigated with saline and patted dry.  The digital tourniquet was removed and prompt response was noted to the digit.      The site was dressed with bacitracin, gauze, and mildly compressive Coban wrap.  The patient tolerated the procedure well and there were no complications.     -Rx Keflex.  -All soaking and aftercare instructions were discussed with the patient.  Informational handout was dispensed.  -Educated patient on acute signs of infection advised patient to seek immediate medical attention if any concerns arise.    The patient indicates understanding of these issues and agrees to the plan.      Shady Morales DPM

## 2024-02-09 NOTE — PROGRESS NOTES
Per Dr Block  to draw up .2.5ml of 1% Lidocaine and 2.5ml marcaine 0.5% for a right hallux Ingrown Toenail Procedure.

## 2024-02-16 ENCOUNTER — OFFICE VISIT (OUTPATIENT)
Dept: PODIATRY CLINIC | Facility: CLINIC | Age: 49
End: 2024-02-16

## 2024-02-16 DIAGNOSIS — L03.031 PARONYCHIA OF GREAT TOE OF RIGHT FOOT: ICD-10-CM

## 2024-02-16 DIAGNOSIS — L60.0 INGROWN NAIL: Primary | ICD-10-CM

## 2024-02-16 PROCEDURE — 99024 POSTOP FOLLOW-UP VISIT: CPT | Performed by: STUDENT IN AN ORGANIZED HEALTH CARE EDUCATION/TRAINING PROGRAM

## 2024-02-16 NOTE — PROGRESS NOTES
[facility]  Progress Note    Roger Batista Jr. is a 48 year old male.   Chief Complaint   Patient presents with    Follow - Up     Right great toe- patient denies pain-          HPI:     Patient is a pleasant 48-year-old male who presents to clinic for follow-up status post matrixectomy to medial border of his right great toe.  He relates that he is doing well.  He has been performing soaking aftercare as advised.  No other complaints are mentioned.      Allergies: Patient has no known allergies.   Current Outpatient Medications   Medication Sig Dispense Refill    cephalexin (KEFLEX) 500 MG Oral Cap Take 1 capsule (500 mg total) by mouth 2 (two) times daily. 14 capsule 0    Fluticasone Furoate (FLONASE SENSIMIST) 27.5 MCG/SPRAY Nasal Suspension       loratadine 10 MG Oral Tab Take 1 tablet (10 mg total) by mouth daily.        Past Medical History:   Diagnosis Date    Achilles rupture, right     Change in hair     Decorative tattoo     Lipid screening 01/13/2012    Sleep apnea     Sleep disturbance     Stress       Past Surgical History:   Procedure Laterality Date    ADENOIDECTOMY      APPENDECTOMY  1993    OTHER SURGICAL HISTORY      Ear surgery Eustachian tube x3    OTHER SURGICAL HISTORY  06/03/2021    achilles repair    TONSILLECTOMY  1985    VASECTOMY  2009      Family History   Problem Relation Age of Onset    Obesity Father     Obesity Mother     Hypertension Mother     Arthritis Mother         RA    Other (Other) Mother         seizures & RA    Diabetes Maternal Grandmother         DM    Other (Other) Paternal Grandmother         PARKINSONS    Heart Attack Paternal Grandfather         MI    Arthritis Sister         RA      Social History     Socioeconomic History    Marital status:    Occupational History     Employer: [employer]   Tobacco Use    Smoking status: Never     Passive exposure: Never    Smokeless tobacco: Never   Vaping Use    Vaping Use: Never used   Substance and  Sexual Activity    Alcohol use: Yes     Comment: 1-2 x per week    Drug use: No    Sexual activity: Yes     Partners: Female   Other Topics Concern    Caffeine Concern Yes     Comment: Daily; 3-4 cups     Exercise Yes     Comment: Weekly; 4-5 days     Seat Belt Yes           REVIEW OF SYSTEMS:     Today reviewed systems as documented below  GENERAL HEALTH: feels well otherwise  SKIN: denies any unusual skin lesions or rashes  RESPIRATORY: denies shortness of breath with exertion  CARDIOVASCULAR: denies chest pain on exertion  GI: denies abdominal pain and denies heartburn  NEURO: denies headaches  MUSCULO: denies arthritis, back pain      EXAM:   There were no vitals taken for this visit.  GENERAL: well developed, well nourished, in no apparent distress  EXTREMITIES:   1. Integument: Normal skin temperature and turgor.  Site of matrixectomy medial border of right great toe is healing well with minimal bioburden.  No acute signs of infection.  2. Vascular: Dorsalis pedis two out of four bilateral and posterior tibial pulses two out of   four bilateral, capillary refill normal.   3. Musculoskeletal: All muscle groups are graded 5 out of 5 in the foot and ankle.  Pain noted to site of ingrown nail.  No strength deficits.   4. Neurological: Normal sharp dull sensation; reflexes normal.      ASSESSMENT AND PLAN:   Diagnoses and all orders for this visit:    Ingrown nail    Paronychia of great toe of right foot            Plan:     -Patient examined, chart history reviewed.  -Inspected site of matrixectomy procedure.  Noted to be healing well without concerns.  -Bioburden curetted free without incident.  Site dressed with bacitracin and Band-Aid.  Continue soaking aftercare for another week or so.  Can follow-up as needed if pain or other concerns arise moving forward.    Shady Morales DPM

## 2024-09-12 ENCOUNTER — OFFICE VISIT (OUTPATIENT)
Dept: FAMILY MEDICINE CLINIC | Facility: CLINIC | Age: 49
End: 2024-09-12
Payer: COMMERCIAL

## 2024-09-12 VITALS
RESPIRATION RATE: 18 BRPM | SYSTOLIC BLOOD PRESSURE: 130 MMHG | OXYGEN SATURATION: 98 % | HEART RATE: 69 BPM | DIASTOLIC BLOOD PRESSURE: 82 MMHG | BODY MASS INDEX: 31.33 KG/M2 | HEIGHT: 75 IN | WEIGHT: 252 LBS

## 2024-09-12 DIAGNOSIS — R03.0 ELEVATED BLOOD PRESSURE READING: ICD-10-CM

## 2024-09-12 DIAGNOSIS — Z00.00 ANNUAL PHYSICAL EXAM: Primary | ICD-10-CM

## 2024-09-12 DIAGNOSIS — R79.89 ELEVATED LFTS: ICD-10-CM

## 2024-09-12 DIAGNOSIS — Z00.00 LABORATORY EXAMINATION ORDERED AS PART OF A ROUTINE GENERAL MEDICAL EXAMINATION: ICD-10-CM

## 2024-09-12 LAB
ALBUMIN SERPL-MCNC: 4.6 G/DL (ref 3.2–4.8)
ALBUMIN/GLOB SERPL: 1.8 {RATIO} (ref 1–2)
ALP LIVER SERPL-CCNC: 95 U/L
ALT SERPL-CCNC: 50 U/L
ANION GAP SERPL CALC-SCNC: 7 MMOL/L (ref 0–18)
AST SERPL-CCNC: 50 U/L (ref ?–34)
BASOPHILS # BLD AUTO: 0.02 X10(3) UL (ref 0–0.2)
BASOPHILS NFR BLD AUTO: 0.5 %
BILIRUB SERPL-MCNC: 0.9 MG/DL (ref 0.3–1.2)
BUN BLD-MCNC: 12 MG/DL (ref 9–23)
CALCIUM BLD-MCNC: 9.9 MG/DL (ref 8.7–10.4)
CHLORIDE SERPL-SCNC: 104 MMOL/L (ref 98–112)
CHOLEST SERPL-MCNC: 203 MG/DL (ref ?–200)
CO2 SERPL-SCNC: 30 MMOL/L (ref 21–32)
CREAT BLD-MCNC: 1.17 MG/DL
EGFRCR SERPLBLD CKD-EPI 2021: 77 ML/MIN/1.73M2 (ref 60–?)
EOSINOPHIL # BLD AUTO: 0.14 X10(3) UL (ref 0–0.7)
EOSINOPHIL NFR BLD AUTO: 3.2 %
ERYTHROCYTE [DISTWIDTH] IN BLOOD BY AUTOMATED COUNT: 12.3 %
EST. AVERAGE GLUCOSE BLD GHB EST-MCNC: 108 MG/DL (ref 68–126)
FASTING PATIENT LIPID ANSWER: YES
FASTING STATUS PATIENT QL REPORTED: YES
GLOBULIN PLAS-MCNC: 2.6 G/DL (ref 2–3.5)
GLUCOSE BLD-MCNC: 94 MG/DL (ref 70–99)
HBA1C MFR BLD: 5.4 % (ref ?–5.7)
HCT VFR BLD AUTO: 49 %
HDLC SERPL-MCNC: 43 MG/DL (ref 40–59)
HGB BLD-MCNC: 17.2 G/DL
IMM GRANULOCYTES # BLD AUTO: 0.01 X10(3) UL (ref 0–1)
IMM GRANULOCYTES NFR BLD: 0.2 %
LDLC SERPL CALC-MCNC: 140 MG/DL (ref ?–100)
LYMPHOCYTES # BLD AUTO: 1.05 X10(3) UL (ref 1–4)
LYMPHOCYTES NFR BLD AUTO: 23.9 %
MCH RBC QN AUTO: 30.7 PG (ref 26–34)
MCHC RBC AUTO-ENTMCNC: 35.1 G/DL (ref 31–37)
MCV RBC AUTO: 87.5 FL
MONOCYTES # BLD AUTO: 0.36 X10(3) UL (ref 0.1–1)
MONOCYTES NFR BLD AUTO: 8.2 %
NEUTROPHILS # BLD AUTO: 2.81 X10 (3) UL (ref 1.5–7.7)
NEUTROPHILS # BLD AUTO: 2.81 X10(3) UL (ref 1.5–7.7)
NEUTROPHILS NFR BLD AUTO: 64 %
NONHDLC SERPL-MCNC: 160 MG/DL (ref ?–130)
OSMOLALITY SERPL CALC.SUM OF ELEC: 292 MOSM/KG (ref 275–295)
PLATELET # BLD AUTO: 253 10(3)UL (ref 150–450)
POTASSIUM SERPL-SCNC: 4.6 MMOL/L (ref 3.5–5.1)
PROT SERPL-MCNC: 7.2 G/DL (ref 5.7–8.2)
RBC # BLD AUTO: 5.6 X10(6)UL
SODIUM SERPL-SCNC: 141 MMOL/L (ref 136–145)
TRIGL SERPL-MCNC: 112 MG/DL (ref 30–149)
TSI SER-ACNC: 1.32 MIU/ML (ref 0.55–4.78)
VLDLC SERPL CALC-MCNC: 21 MG/DL (ref 0–30)
WBC # BLD AUTO: 4.4 X10(3) UL (ref 4–11)

## 2024-09-12 PROCEDURE — 83036 HEMOGLOBIN GLYCOSYLATED A1C: CPT | Performed by: STUDENT IN AN ORGANIZED HEALTH CARE EDUCATION/TRAINING PROGRAM

## 2024-09-12 PROCEDURE — 84443 ASSAY THYROID STIM HORMONE: CPT | Performed by: STUDENT IN AN ORGANIZED HEALTH CARE EDUCATION/TRAINING PROGRAM

## 2024-09-12 PROCEDURE — 85025 COMPLETE CBC W/AUTO DIFF WBC: CPT | Performed by: STUDENT IN AN ORGANIZED HEALTH CARE EDUCATION/TRAINING PROGRAM

## 2024-09-12 PROCEDURE — 99396 PREV VISIT EST AGE 40-64: CPT | Performed by: STUDENT IN AN ORGANIZED HEALTH CARE EDUCATION/TRAINING PROGRAM

## 2024-09-12 PROCEDURE — 80053 COMPREHEN METABOLIC PANEL: CPT | Performed by: STUDENT IN AN ORGANIZED HEALTH CARE EDUCATION/TRAINING PROGRAM

## 2024-09-12 PROCEDURE — 80061 LIPID PANEL: CPT | Performed by: STUDENT IN AN ORGANIZED HEALTH CARE EDUCATION/TRAINING PROGRAM

## 2024-09-12 NOTE — PROGRESS NOTES
[address]      Pt Name: Pepe Hernandez  MRN: [id number]  [address] [place] 1946  Date of evaluation: 9/15/2023  Provider: Marilyn Llanes MD    CHIEF COMPLAINT       Chief Complaint   Patient presents with    Shortness of Breath     Started a couple days ago with sob and his heart has been beating faster than normal         HISTORY OF PRESENT ILLNESS      Pepe Hernandez is a 78 y.o. male who presents to the emergency department with us with 1 week history of shortness of breath associated with a cough also mentioned that he have a history of A-fib and A-fib has been out of control in the last few days    The patient denies any chest pain or fever but he does have a productive cough with his shortness of breath he also have chest tightness    No leg edema        REVIEW OF SYSTEMS       Review of Systems   All other systems reviewed and are negative. PAST MEDICAL HISTORY     Past Medical History:   Diagnosis Date    Abdominal aneurysm ([address])     x2    Aortic aneurysm (HCC)     Atrial fibrillation (HCC)     Chicken pox     Chronic back pain     COPD (chronic obstructive pulmonary disease) (HCC)     Emphysema of lung (HCC)     Emphysema of lung (HCC)     Hyperlipidemia     Hypertension 2015    Osteoarthritis          SURGICAL HISTORY       Past Surgical History:   Procedure Laterality Date    CARDIAC CATHETERIZATION Left 08/05/15    Dr. Jean Lombardi 30% disease of the left main trunck, Mild plaque disease in the coronary arteries.   Normal LV function, ejection fraction of 60%    EYE SURGERY           CURRENT MEDICATIONS       Previous Medications    ALBUTEROL (PROVENTIL) (2.5 MG/3ML) 0.083% NEBULIZER SOLUTION    Take 3 mLs by nebulization every 6 hours as needed for Wheezing    ALBUTEROL (PROVENTIL;VENTOLIN) 90 MCG/ACT INHALER    Inhale 2 puffs into the lungs every 6 hours as needed    ASPIRIN 81 MG EC TABLET    Take 1 tablet by mouth daily [facility] Office Note    HPI:     Roger Batista Jr. is a 49 year old male presenting for annual exam.     He states typically at home blood pressure is in 130s over lower 90s. He has however been working out regularly but has noted diet could be improved.     Diet: trying to cut down on calories  Exercise: trying to get back into more weight lifting  Tobacco: denies   Alcohol: 2 days a week, 1-2 drinks per occasion  Other Drugs: denies     AAA ultrasound screen? (age 65-75 with any smoking history): not indicated  Aspirin use? (age 50-59 with ASCVD risk 10% or greater): not indicated  Colonoscopy screen? (age 45-75 or earlier based on risk): not indicated  Depression screen? (PHQ-2 or PHQ-9): PHQ-2 Score of 0  Diabetes screen? (age 35 to 70 who are overweight or obese): A1c ordered  Fall Prevention? (age 65 and older): not indicated  Lipid panel? (age 20-35 with increased risk of CHD or older than 35): ordered  Lung cancer screen? (age 50-80 w/ 20 pack year smoking history and currently smoking or quit in last 15 yrs): not indicated    HISTORY:  Past Medical History:    Achilles rupture, right    Change in hair    Decorative tattoo    Lipid screening    Sleep apnea    Sleep disturbance    Stress      Past Surgical History:   Procedure Laterality Date    Adenoidectomy      Appendectomy  1993    Other surgical history      Ear surgery Eustachian tube x3    Other surgical history  06/03/2021    achilles repair    Tonsillectomy  1985    Vasectomy  2009      Family History   Problem Relation Age of Onset    Obesity Father     Obesity Mother     Hypertension Mother     Arthritis Mother         RA    Other (Other) Mother         seizures & RA    Diabetes Maternal Grandmother         DM    Other (Other) Paternal Grandmother         PARKINSONS    Heart Attack Paternal Grandfather         MI    Arthritis Sister         RA      Social History:   Social History     Socioeconomic History    Marital  status:    Occupational History     Employer: [employer]   Tobacco Use    Smoking status: Never     Passive exposure: Never    Smokeless tobacco: Never   Vaping Use    Vaping status: Never Used   Substance and Sexual Activity    Alcohol use: Yes     Comment: 1-2 x per week    Drug use: No    Sexual activity: Yes     Partners: Female   Other Topics Concern    Caffeine Concern Yes     Comment: Daily; 3-4 cups     Exercise Yes     Comment: Weekly; 4-5 days     Seat Belt Yes        Medications (Active prior to today's visit):  Current Outpatient Medications   Medication Sig Dispense Refill    Fluticasone Furoate (FLONASE SENSIMIST) 27.5 MCG/SPRAY Nasal Suspension       loratadine 10 MG Oral Tab Take 1 tablet (10 mg total) by mouth daily.         Allergies:  No Known Allergies      ROS:   Review of Systems   Constitutional:  Negative for chills and fever.     Otherwise see HPI    PHYSICAL EXAM:   /82 (BP Location: Left arm, Patient Position: Sitting, Cuff Size: adult)   Pulse 69   Resp 18   Ht 6' 3\" (1.905 m)   Wt 252 lb (114.3 kg)   SpO2 98%   BMI 31.50 kg/m²  Estimated body mass index is 31.5 kg/m² as calculated from the following:    Height as of this encounter: 6' 3\" (1.905 m).    Weight as of this encounter: 252 lb (114.3 kg).   Vital signs reviewed.Appears stated age, well groomed.    Physical Exam  Constitutional:       General: He is not in acute distress.  HENT:      Nose: Nose normal.      Mouth/Throat:      Mouth: Mucous membranes are moist.      Pharynx: Oropharynx is clear.   Eyes:      Conjunctiva/sclera: Conjunctivae normal.      Pupils: Pupils are equal, round, and reactive to light.   Cardiovascular:      Rate and Rhythm: Normal rate and regular rhythm.      Heart sounds: Normal heart sounds.   Pulmonary:      Effort: Pulmonary effort is normal.      Breath sounds: Normal breath sounds.   Abdominal:      General: Bowel sounds are normal.      Palpations: Abdomen is soft.       Tenderness: There is no abdominal tenderness. There is no guarding or rebound.   Lymphadenopathy:      Cervical: No cervical adenopathy.   Neurological:      Mental Status: He is alert.           ASSESSMENT/PLAN:     49 year old male presenting for annual exam.       1. Annual physical exam  - encourage well-balanced diet with fruits/vegetables, limited fried/fatty foods, and limited take-out   - encourage at least 150 minutes of moderate intensity aerobic activity weekly     2. Laboratory examination ordered as part of a routine general medical examination  - CBC With Differential With Platelet  - Comp Metabolic Panel (14)  - Hemoglobin A1C  - TSH W Reflex To Free T4  - Lipid Panel    3. Elevated blood pressure reading  - overall appears relatively borderline at home, reading in clinic stable  - will see if this improves with patient's plan for weight loss     Follow-up: as needed    Outcome: Patient verbalizes understanding. Patient is notified to call with any questions, complications, allergies, or worsening or changing symptoms.  Patient is to call with any side effects or complications from the treatments as a result of today.     Total length of visit/charting: approximately 20 min    Simeon Jones MD, 09/12/24, 7:34 AM      Please note that portions of this note may have been completed with a voice recognition program. Efforts were made to edit the dictations but occasionally words are mis-transcribed.

## 2024-09-27 ENCOUNTER — LAB ENCOUNTER (OUTPATIENT)
Dept: LAB | Age: 49
End: 2024-09-27
Attending: STUDENT IN AN ORGANIZED HEALTH CARE EDUCATION/TRAINING PROGRAM
Payer: COMMERCIAL

## 2024-09-27 DIAGNOSIS — R79.89 ELEVATED LFTS: ICD-10-CM

## 2024-09-27 LAB
ALBUMIN SERPL-MCNC: 4.6 G/DL (ref 3.2–4.8)
ALP LIVER SERPL-CCNC: 99 U/L
ALT SERPL-CCNC: 43 U/L
AST SERPL-CCNC: 23 U/L (ref ?–34)
BILIRUB DIRECT SERPL-MCNC: 0.1 MG/DL (ref ?–0.3)
BILIRUB SERPL-MCNC: 0.4 MG/DL (ref 0.3–1.2)
HAV IGM SER QL: NONREACTIVE
HBV CORE IGM SER QL: NONREACTIVE
HBV SURFACE AB SER QL: REACTIVE
HBV SURFACE AB SERPL IA-ACNC: >1000 MIU/ML
HBV SURFACE AG SERPL QL IA: NONREACTIVE
HCV AB SERPL QL IA: NONREACTIVE
PROT SERPL-MCNC: 6.9 G/DL (ref 5.7–8.2)

## 2024-09-27 PROCEDURE — 86706 HEP B SURFACE ANTIBODY: CPT

## 2024-09-27 PROCEDURE — 80074 ACUTE HEPATITIS PANEL: CPT

## 2024-09-27 PROCEDURE — 80076 HEPATIC FUNCTION PANEL: CPT

## 2024-09-27 PROCEDURE — 36415 COLL VENOUS BLD VENIPUNCTURE: CPT

## 2024-10-19 ENCOUNTER — HOSPITAL ENCOUNTER (OUTPATIENT)
Dept: CT IMAGING | Age: 49
Discharge: HOME | End: 2024-10-19
Attending: STUDENT IN AN ORGANIZED HEALTH CARE EDUCATION/TRAINING PROGRAM

## 2024-10-19 DIAGNOSIS — Z13.9 SPECIAL SCREENING: ICD-10-CM

## 2024-12-09 ENCOUNTER — OFFICE VISIT (OUTPATIENT)
Dept: FAMILY MEDICINE CLINIC | Facility: CLINIC | Age: 49
End: 2024-12-09
Payer: COMMERCIAL

## 2024-12-09 VITALS
OXYGEN SATURATION: 96 % | TEMPERATURE: 99 F | BODY MASS INDEX: 32 KG/M2 | HEART RATE: 69 BPM | SYSTOLIC BLOOD PRESSURE: 128 MMHG | WEIGHT: 259.63 LBS | RESPIRATION RATE: 18 BRPM | DIASTOLIC BLOOD PRESSURE: 90 MMHG

## 2024-12-09 DIAGNOSIS — H66.92 LEFT OTITIS MEDIA, UNSPECIFIED OTITIS MEDIA TYPE: Primary | ICD-10-CM

## 2024-12-09 RX ORDER — AMOXICILLIN 875 MG/1
875 TABLET, COATED ORAL 2 TIMES DAILY
Qty: 14 TABLET | Refills: 0 | Status: SHIPPED | OUTPATIENT
Start: 2024-12-09 | End: 2024-12-16

## 2024-12-09 NOTE — PROGRESS NOTES
CHIEF COMPLAINT:     Chief Complaint   Patient presents with    Sinus Problem     Ear and nose congestion. - Entered by patient  S/s for 5 days,  left ear pain worst since AM.  OTC meds taken       HPI:   Roger Batista Jr. is a 49 year old male who presents to clinic today with complaints of left ear pain. Has had for 1  days. Pain is described as sharp.  Patient denies history of ear infections. Home treatment includes otc.     Associated symptoms:  Patient denies decreased hearing. Patient denies hearing loss. Patient denies drainage. Patient denies use of cotton tipped ear swabs to clean the ears. Patient reports following URI symptoms: nasal congestion, cough, runny nose    Current Outpatient Medications   Medication Sig Dispense Refill    amoxicillin 875 MG Oral Tab Take 1 tablet (875 mg total) by mouth 2 (two) times daily for 7 days. 14 tablet 0    Fluticasone Furoate (FLONASE SENSIMIST) 27.5 MCG/SPRAY Nasal Suspension       loratadine 10 MG Oral Tab Take 1 tablet (10 mg total) by mouth daily.        Past Medical History:    Achilles rupture, right    Change in hair    Decorative tattoo    Lipid screening    Sleep apnea    Sleep disturbance    Stress      Social History:  Social History     Socioeconomic History    Marital status:    Occupational History     Employer: [employer]   Tobacco Use    Smoking status: Never     Passive exposure: Never    Smokeless tobacco: Never   Vaping Use    Vaping status: Never Used   Substance and Sexual Activity    Alcohol use: Yes     Comment: 1-2 x per week    Drug use: No    Sexual activity: Yes     Partners: Female   Other Topics Concern    Caffeine Concern Yes     Comment: Daily; 3-4 cups     Exercise Yes     Comment: Weekly; 4-5 days     Seat Belt Yes        REVIEW OF SYSTEMS:   GENERAL: See HPI  SKIN: no unusual skin lesions or rashes  HEENT: See HPI  LUNGS: No shortness of breath, or wheezing.  CARDIOVASCULAR: No chest pain, palpitations  GI: No  N/V/C/D.  NEURO: denies headaches or dizziness    EXAM:   /90   Pulse 69   Temp 99.3 °F (37.4 °C) (Oral)   Resp 18   Wt 259 lb 9.6 oz (117.8 kg)   SpO2 96%   BMI 32.45 kg/m²   GENERAL: well developed, well nourished,in no apparent distress  SKIN: no rashes,no suspicious lesions  HEAD: atraumatic, normocephalic  EYES: conjunctiva clear, EOM intact  EARS: bilat tragus non tender with manipulation.  External auditory canals clear. Right TM: grey, no bulging, no retraction, noeffusion, bony landmarks visible.  Left TM: + erythema, + bulging, no retraction, + effusion, bony landmarks obscured.  NOSE: nostrils patent, clear nasal mucus, nasal mucosa + erythema  THROAT: oral mucosa pink, moist. Posterior pharynx not erythematous or injected. No exudates.  NECK: supple, non-tender  LUNGS: clear to auscultation bilaterally, no wheezes or rhonchi. Breathing is non labored.  CARDIO: RRR without murmur  EXTREMITIES: no cyanosis, clubbing or edema  LYMPH: no cervical lymphadenopathy.      ASSESSMENT AND PLAN:   Roger Batista Jr. is a 49 year old male who presents with ear problems symptoms are consistent with    ASSESSMENT:  Encounter Diagnosis   Name Primary?    Left otitis media, unspecified otitis media type Yes       PLAN: Meds as listed below.  Comfort measures as described in Patient Instructions    Meds & Refills for this Visit:  Requested Prescriptions     Signed Prescriptions Disp Refills    amoxicillin 875 MG Oral Tab 14 tablet 0     Sig: Take 1 tablet (875 mg total) by mouth 2 (two) times daily for 7 days.         Risk and benefits of medication discussed.   If antibiotics prescribed, stressed importance of completing full course of antibiotic.     Acetaminophen or NSAID prn pain.      Follow up with PCP if s/sx worsen, do not begin to improve in 3 days, or if fever of 100.4 or greater persists for 72 hours.      Patient voiced understand and is in agreement with treatment plan.    There are no Patient  Instructions on file for this visit.

## 2024-12-30 ENCOUNTER — TELEPHONE (OUTPATIENT)
Dept: FAMILY MEDICINE CLINIC | Facility: CLINIC | Age: 49
End: 2024-12-30

## 2024-12-30 NOTE — TELEPHONE ENCOUNTER
Medical Records Request received from [facility] for records from December 23, 2021 to December 23, 2024..     [facility]. Copy sent to [facility].

## 2025-03-07 ENCOUNTER — HOSPITAL ENCOUNTER (OUTPATIENT)
Dept: GENERAL RADIOLOGY | Age: 50
Discharge: HOME | End: 2025-03-07
Attending: STUDENT IN AN ORGANIZED HEALTH CARE EDUCATION/TRAINING PROGRAM
Payer: COMMERCIAL

## 2025-03-07 ENCOUNTER — OFFICE VISIT (OUTPATIENT)
Dept: FAMILY MEDICINE CLINIC | Facility: CLINIC | Age: 50
End: 2025-03-07
Payer: COMMERCIAL

## 2025-03-07 VITALS
HEIGHT: 75 IN | WEIGHT: 271 LBS | HEART RATE: 74 BPM | RESPIRATION RATE: 18 BRPM | SYSTOLIC BLOOD PRESSURE: 136 MMHG | BODY MASS INDEX: 33.69 KG/M2 | OXYGEN SATURATION: 96 % | DIASTOLIC BLOOD PRESSURE: 90 MMHG

## 2025-03-07 DIAGNOSIS — R22.31 MASS OF FINGER OF RIGHT HAND: ICD-10-CM

## 2025-03-07 DIAGNOSIS — R22.31 MASS OF FINGER OF RIGHT HAND: Primary | ICD-10-CM

## 2025-03-07 PROCEDURE — 73140 X-RAY EXAM OF FINGER(S): CPT | Performed by: STUDENT IN AN ORGANIZED HEALTH CARE EDUCATION/TRAINING PROGRAM

## 2025-03-07 PROCEDURE — 99213 OFFICE O/P EST LOW 20 MIN: CPT | Performed by: STUDENT IN AN ORGANIZED HEALTH CARE EDUCATION/TRAINING PROGRAM

## 2025-03-07 NOTE — PROGRESS NOTES
[facility] Family Medicine Office Note    HPI:     Roger Batista Jr. is a 50 year old male presenting for bump on finger.     Has noted a firm bump over dorsal aspect of right 4th digit (ring finger) for about a month. Not increasing in size. May have some underlying hardness related to bone but overall states that it feels more like a fluid-filled cyst. Denies tenderness to palpation. ROM of finger intact.     HISTORY:  Past Medical History:    Achilles rupture, right    Change in hair    Decorative tattoo    Lipid screening    Sleep apnea    Sleep disturbance    Stress      Past Surgical History:   Procedure Laterality Date    Adenoidectomy      Appendectomy  1993    Other surgical history      Ear surgery Eustachian tube x3    Other surgical history  06/03/2021    achilles repair    Tonsillectomy  1985    Vasectomy  2009      Family History   Problem Relation Age of Onset    Obesity Father     Obesity Mother     Hypertension Mother     Arthritis Mother         RA    Other (Other) Mother         seizures & RA    Diabetes Maternal Grandmother         DM    Other (Other) Paternal Grandmother         PARKINSONS    Heart Attack Paternal Grandfather         MI    Arthritis Sister         RA      Social History:   Social History     Socioeconomic History    Marital status:    Occupational History     Employer: [employer]   Tobacco Use    Smoking status: Never     Passive exposure: Never    Smokeless tobacco: Never   Vaping Use    Vaping status: Never Used   Substance and Sexual Activity    Alcohol use: Yes     Comment: 1-2 x per week    Drug use: No    Sexual activity: Yes     Partners: Female   Other Topics Concern    Caffeine Concern Yes     Comment: Daily; 3-4 cups     Exercise Yes     Comment: Weekly; 4-5 days     Seat Belt Yes     Social Drivers of Health     Food Insecurity: No Food Insecurity (3/7/2025)    NCSS - Food Insecurity     Worried About Running Out of Food in the Last Year: No      Ran Out of Food in the Last Year: No   Transportation Needs: No Transportation Needs (3/7/2025)    NCSS - Transportation     Lack of Transportation: No   Housing Stability: Not At Risk (3/7/2025)    NCSS - Housing/Utilities     Has Housing: Yes     Worried About Losing Housing: No     Unable to Get Utilities: No        Medications (Active prior to today's visit):  Current Outpatient Medications   Medication Sig Dispense Refill    Fluticasone Furoate (FLONASE SENSIMIST) 27.5 MCG/SPRAY Nasal Suspension       loratadine 10 MG Oral Tab Take 1 tablet (10 mg total) by mouth daily.         Allergies:  Allergies[1]      ROS:   Review of Systems   Skin:         +bump over right fourth digit     Otherwise see HPI    PHYSICAL EXAM:   /90 (BP Location: Left arm, Patient Position: Sitting, Cuff Size: adult)   Pulse 74   Resp 18   Ht 6' 3\" (1.905 m)   Wt 271 lb (122.9 kg)   SpO2 96%   BMI 33.87 kg/m²  Estimated body mass index is 33.87 kg/m² as calculated from the following:    Height as of this encounter: 6' 3\" (1.905 m).    Weight as of this encounter: 271 lb (122.9 kg).   Vital signs reviewed.Appears stated age, well groomed.    Physical Exam  Constitutional:       General: He is not in acute distress.  Skin:     Comments: +small firm subcutaneous nodular structure over dorsal aspect of right 4th PIP, non-tender to palpation, ROM of finger intact    Neurological:      Mental Status: He is alert.           ASSESSMENT/PLAN:     50 year old male presenting for bump on finger.     1. Mass of finger of right hand  - may simply be cyst but given there appears to be bony aspect to lesion, would recommend imaging   - XR FINGER(S) (MIN 2 VIEWS), RIGHT 4TH (CPT=73140); Future    Follow-up: as needed     Outcome: Patient verbalizes understanding. Patient is notified to call with any questions, complications, allergies, or worsening or changing symptoms.  Patient is to call with any side effects or complications from the  treatments as a result of today.     Total length of visit/charting: approximately 16 min    Simeon Jones MD, 03/07/25, 3:05 PM      Please note that portions of this note may have been completed with a voice recognition program. Efforts were made to edit the dictations but occasionally words are mis-transcribed.         [1] No Known Allergies

## 2025-03-07 NOTE — PATIENT INSTRUCTIONS
Blood Pressure Cuff Brand: OMRON 3-Series Arm Cuff  Take your blood pressure once in the morning and once in the evening for at least 7 days.   If your average blood pressure is close to 120/80 then this is good.   If however your average blood pressure is at 140/90 or above you may require blood pressure medications.

## 2025-05-12 ENCOUNTER — PATIENT MESSAGE (OUTPATIENT)
Dept: FAMILY MEDICINE CLINIC | Facility: CLINIC | Age: 50
End: 2025-05-12

## 2025-05-12 DIAGNOSIS — R09.81 SINUS CONGESTION: Primary | ICD-10-CM

## 2025-05-12 RX ORDER — AZELASTINE 1 MG/ML
2 SPRAY, METERED NASAL NIGHTLY PRN
Qty: 30 ML | Refills: 0 | Status: SHIPPED | OUTPATIENT
Start: 2025-05-12

## 2025-05-12 NOTE — TELEPHONE ENCOUNTER
Have not seen by you for this issue     Your next availability is not until 5/20    ENT referral pended or can try other OTC relief?

## 2025-05-12 NOTE — TELEPHONE ENCOUNTER
I placed ENT referral. In the meantime patient can try taking azelastine nasal spray at night (prescribed to pharmacy on file) in addition to flonase in morning.     If claritin not helping can try switching to allegra instead.     Simeon Jones MD, 05/12/25, 1:31 PM

## 2025-06-05 ENCOUNTER — OFFICE VISIT (OUTPATIENT)
Facility: LOCATION | Age: 50
End: 2025-06-05
Payer: COMMERCIAL

## 2025-06-05 DIAGNOSIS — H69.93 DYSFUNCTION OF BOTH EUSTACHIAN TUBES: Primary | ICD-10-CM

## 2025-06-05 DIAGNOSIS — R09.81 NASAL CONGESTION: ICD-10-CM

## 2025-06-05 PROCEDURE — 99203 OFFICE O/P NEW LOW 30 MIN: CPT | Performed by: OTOLARYNGOLOGY

## 2025-06-05 NOTE — PROGRESS NOTES
Roger Batsita Jr. is a 50 year old male. No chief complaint on file.    HPI:   He was sick in May.  He then noticed fullness and pressure on his ears.  He had been using Flonase and Claritin for allergies.  He added Astelin that seemed to help.  He has had sinus infections in the past but not recently.  He does get nasal congestion especially with mowing the grass.  He denies ear infections or sinusitis.  Current Medications[1]   Past Medical History[2]   Social History:  Short Social Hx on File[3]   Past Surgical History[4]      REVIEW OF SYSTEMS:   GENERAL HEALTH: feels well otherwise  GENERAL : denies fever, chills, sweats, weight loss, weight gain  SKIN: denies any unusual skin lesions or rashes  RESPIRATORY: denies shortness of breath with exertion  NEURO: denies headaches    EXAM:   There were no vitals taken for this visit.    System Findings Details   Constitutional  Overall appearance - Normal.   Psychiatric  Orientation - Oriented to time, place, person & situation. Appropriate mood and affect.   Head/Face  Facial features -- Normal. Skull - Normal.   Eyes  Pupils equal ,round ,react to light and accomidate   Ears, Nose, Throat, Neck  Ears clear no fluid nose mild erythema with septal deviation to the left oral cavity clear pharynx cobblestoning neck no masses   Neurological  Memory - Normal. Cranial nerves - Cranial nerves II through XII grossly intact.   Lymph Detail  Submental. Submandibular. Anterior cervical. Posterior cervical. Supraclavicular.       ASSESSMENT AND PLAN:   1. Dysfunction of both eustachian tubes  The symptoms were bad but improved with the addition of Astelin nasal spray.  He will continue with Flonase.  He may continue with Claritin or add something stronger such as Allegra or Zyrtec.  He will add nasal saline.  He may continue to use Astelin from time to time to help.    2. Nasal congestion  I do believe most of his symptoms are allergic and not chronic sinusitis.  Given the above  at some point he we will consider an allergy evaluation.      The patient indicates understanding of these issues and agrees to the plan.    No follow-ups on file.    Dani Shirley MD  6/5/2025  3:14 PM       [1]   Current Outpatient Medications   Medication Sig Dispense Refill    azelastine 0.1 % Nasal Solution 2 sprays by Nasal route nightly as needed (for sinus congestion). 30 mL 0    Fluticasone Furoate (FLONASE SENSIMIST) 27.5 MCG/SPRAY Nasal Suspension       loratadine 10 MG Oral Tab Take 1 tablet (10 mg total) by mouth daily.     [2]   Past Medical History:   Achilles rupture, right    Change in hair    Decorative tattoo    Lipid screening    Sleep apnea    Sleep disturbance    Stress   [3]   Social History  Socioeconomic History    Marital status:    Occupational History     Employer: [employer]   Tobacco Use    Smoking status: Never     Passive exposure: Never    Smokeless tobacco: Never   Vaping Use    Vaping status: Never Used   Substance and Sexual Activity    Alcohol use: Yes     Comment: 1-2 x per week    Drug use: No    Sexual activity: Yes     Partners: Female   Other Topics Concern    Caffeine Concern Yes     Comment: Daily; 3-4 cups     Exercise Yes     Comment: Weekly; 4-5 days     Seat Belt Yes     Social Drivers of Health     Food Insecurity: No Food Insecurity (3/7/2025)    NCSS - Food Insecurity     Worried About Running Out of Food in the Last Year: No     Ran Out of Food in the Last Year: No   Transportation Needs: No Transportation Needs (3/7/2025)    NCSS - Transportation     Lack of Transportation: No   Housing Stability: Not At Risk (3/7/2025)    NCSS - Housing/Utilities     Has Housing: Yes     Worried About Losing Housing: No     Unable to Get Utilities: No   [4]   Past Surgical History:  Procedure Laterality Date    Adenoidectomy      Appendectomy  1993    Other surgical history      Ear surgery Eustachian tube x3    Other surgical history  06/03/2021    achilles  repair    Tonsillectomy  1985    Vasectomy  2009

## (undated) DIAGNOSIS — B35.1 ONYCHOMYCOSIS: Primary | ICD-10-CM

## (undated) DEVICE — SPLINT PRECUT SYNTH 5X30

## (undated) DEVICE — CONVERTORS STOCKINETTE: Brand: CONVERTORS

## (undated) DEVICE — NEEDLE ANCHOR 1824-3D

## (undated) DEVICE — SCD SLEEVE KNEE HI BLEND

## (undated) DEVICE — SUTURE NABSB OTHCRD 2 OS-6

## (undated) DEVICE — 3M™ STERI-STRIP™ REINFORCED ADHESIVE SKIN CLOSURES, R1547, 1/2 IN X 4 IN (12 MM X 100 MM), 6 STRIPS/ENVELOPE: Brand: 3M™ STERI-STRIP™

## (undated) DEVICE — UNIVERSAL STERIBUMP® STERILE (5/CASE): Brand: UNIVERSAL STERIBUMP®

## (undated) DEVICE — NON-ADHERENT STRIPS,OIL EMULSION: Brand: CURITY

## (undated) DEVICE — Device

## (undated) DEVICE — ELECTRODE TUNG MICRO STR 3CM

## (undated) DEVICE — SOL  .9 1000ML BTL

## (undated) DEVICE — DISPOSABLE TOURNIQUET CUFF SINGLE BLADDER, DUAL PORT AND QUICK CONNECT CONNECTOR: Brand: COLOR CUFF

## (undated) DEVICE — LOWER EXTREMITY CDS-LF: Brand: MEDLINE INDUSTRIES, INC.

## (undated) DEVICE — SUTURE VICRYL 3-0 CT-2

## (undated) DEVICE — #15 STERILE STAINLESS BLADE: Brand: STERILE STAINLESS BLADES

## (undated) DEVICE — STERILE POLYISOPRENE POWDER-FREE SURGICAL GLOVES WITH EMOLLIENT COATING: Brand: PROTEXIS

## (undated) DEVICE — SUTR NONABS 1.3MM 36IN STRL .5

## (undated) DEVICE — STERILE HOOK LOCK LATEX FREE ELASTIC BANDAGE 6INX5YD: Brand: HOOK LOCK™

## (undated) DEVICE — STERILE POLYISOPRENE POWDER-FREE SURGICAL GLOVES: Brand: PROTEXIS

## (undated) DEVICE — PADDING CAST COTTON STER 4

## (undated) NOTE — LETTER
AUTHORIZATION FOR SURGICAL OPERATION OR OTHER PROCEDURE    1. I hereby authorize Dr. Balwindre Nicole, and [place] [facility] staff assigned to my case to perform the following operation and/or procedure at the [facility]:    _______________________________________________________________________________________________    Right big toe nail correction  _______________________________________________________________________________________________    2. My physician has explained the nature and purpose of the operation or other procedure, possible alternative methods of treatment, the risks involved, and the possibility of complication to me. I acknowledge that no guarantee has been made as to the result that may be obtained. 3.  I recognize that, during the course of this operation, or other procedure, unforseen conditions may necessitate additional or different procedure than those listed above. I, therefore, further authorize and request that the above named physician, his/her physician assistants or designees perform such procedures as are, in his/her professional opinion, necessary and desirable. 4.  Any tissue or organs removed in the operation or other procedure may be disposed of by and at the discretion of the [facility] and [facility]. 5.  I understand that in the event of a medical emergency, I will be transported by local paramedics to [facility] or other hospital emergency department. 6.  I certify that I have read and fully understand the above consent to operation and/or other procedure. 7.  I acknowledge that my physician has explained sedation/analgesia administration to me including the risks and benefits. I consent to the administration of sedation/analgesia as may be necessary or desirable in the judgement of my physician.     Witness signature: ___________________________________________________ Date:  ______/______/_____                    Time: ________ A. M.  P.M. Patient Name:  ______________________________________________________  (please print)      Patient signature:  ___________________________________________________             Relationship to Patient:           []  Parent    Responsible person                          []  Spouse  In case of minor or                    [] Other  _____________   Incompetent name:  __________________________________________________                               (please print)      _____________      Responsible person  In case of minor or  Incompetent signature:  _______________________________________________    Statement of Physician  My signature below affirms that prior to the time of the procedure, I have explained to the patient and/or his/her guardian, the risks and benefits involved in the proposed treatment and any reasonable alternative to the proposed treatment. I have also explained the risks and benefits involved in the refusal of the proposed treatment and have answered the patient's questions.                         Date:  ______/______/_______  Provider                      Signature:  __________________________________________________________       Time:  ___________ A.M    P.M.

## (undated) NOTE — LETTER
AUTHORIZATION FOR SURGICAL OPERATION OR OTHER PROCEDURE    1. I hereby authorize Dr. Shady Morales, and [facility] staff assigned to my case to perform the following operation and/or procedure at the [facility]:    _______________________________________________________________________________________________    Nail Avulsion Right Great Toe  _______________________________________________________________________________________________    2.  My physician has explained the nature and purpose of the operation or other procedure, possible alternative methods of treatment, the risks involved, and the possibility of complication to me.  I acknowledge that no guarantee has been made as to the result that may be obtained.  3.  I recognize that, during the course of this operation, or other procedure, unforseen conditions may necessitate additional or different procedure than those listed above.  I, therefore, further authorize and request that the above named physician, his/her physician assistants or designees perform such procedures as are, in his/her professional opinion, necessary and desirable.  4.  Any tissue or organs removed in the operation or other procedure may be disposed of by and at the discretion of the [facility] and [facility].  5.  I understand that in the event of a medical emergency, I will be transported by local paramedics to [facility] or other hospital emergency department.  6.  I certify that I have read and fully understand the above consent to operation and/or other procedure.    7.  I acknowledge that my physician has explained sedation/analgesia administration to me including the risks and benefits.  I consent to the administration of sedation/analgesia as may be necessary or desirable in the judgement of my physician.    Witness signature: ___________________________________________________ Date:  ______/______/_____                    Time:   ________ A.M.  P.M.       Patient Name:  ______________________________________________________  (please print)      Patient signature:  ___________________________________________________             Relationship to Patient:           []  Parent    Responsible person                          []  Spouse  In case of minor or                    [] Other  _____________   Incompetent name:  __________________________________________________                               (please print)      _____________      Responsible person  In case of minor or  Incompetent signature:  _______________________________________________    Statement of Physician  My signature below affirms that prior to the time of the procedure, I have explained to the patient and/or his/her guardian, the risks and benefits involved in the proposed treatment and any reasonable alternative to the proposed treatment.  I have also explained the risks and benefits involved in the refusal of the proposed treatment and have answered the patient's questions.                        Date:  ______/______/_______  Provider                      Signature:  __________________________________________________________       Time:  ___________ A.M    P.M.

## (undated) NOTE — ED AVS SNAPSHOT
[facility] in [address] [place] [address] Drive,[address] Floor    [address] [facility]    Phone:  [phone]    Fax:  [phone]   MRN: [id number]    Department:  [facility]   Date of Visit:  4/29/2017 wrap is the most simple form of compression. You can also wear a brace. Elevation: Raise the injured extremity above heart level. This will reduce throbbing pain and swelling associated with injures.   Prop the injured extremity up with pillows while lyi You were examined and treated today on an urgent basis only. This was not a substitute for ongoing medical care. Often, one Immediate Care visit does not uncover every injury or illness.  If you have been referred to a primary care or a specialist physicia Olamide Ham [address] ([address]) [address]  Leonides Yang (Familia Guillen) [phone] [address] ([address]) [phone]                Additional Information       We are concerned for your o You can access your MyChart to more actively manage your health care and view more details from this visit by going to [email or web address].   If you've recently had a stay at the Hospital you can access your discharge instructions in [address] by ana